# Patient Record
Sex: FEMALE | Race: WHITE | HISPANIC OR LATINO | Employment: FULL TIME | URBAN - METROPOLITAN AREA
[De-identification: names, ages, dates, MRNs, and addresses within clinical notes are randomized per-mention and may not be internally consistent; named-entity substitution may affect disease eponyms.]

---

## 2019-01-21 ENCOUNTER — NON-PROVIDER VISIT (OUTPATIENT)
Dept: URGENT CARE | Facility: PHYSICIAN GROUP | Age: 24
End: 2019-01-21

## 2019-01-21 DIAGNOSIS — Z02.1 PRE-EMPLOYMENT DRUG SCREENING: ICD-10-CM

## 2019-01-21 LAB
AMP AMPHETAMINE: NORMAL
COC COCAINE: NORMAL
INT CON NEG: NEGATIVE
INT CON POS: POSITIVE
MET METHAMPHETAMINES: NORMAL
OPI OPIATES: NORMAL
PCP PHENCYCLIDINE: NORMAL
POC DRUG COMMENT 753798-OCCUPATIONAL HEALTH: NEGATIVE
THC: NORMAL

## 2019-01-21 PROCEDURE — 80305 DRUG TEST PRSMV DIR OPT OBS: CPT | Performed by: PHYSICIAN ASSISTANT

## 2019-01-31 ENCOUNTER — EH NON-PROVIDER (OUTPATIENT)
Dept: OCCUPATIONAL MEDICINE | Facility: CLINIC | Age: 24
End: 2019-01-31

## 2019-01-31 ENCOUNTER — HOSPITAL ENCOUNTER (OUTPATIENT)
Facility: MEDICAL CENTER | Age: 24
End: 2019-01-31
Attending: PREVENTIVE MEDICINE
Payer: COMMERCIAL

## 2019-01-31 ENCOUNTER — EMPLOYEE HEALTH (OUTPATIENT)
Dept: OCCUPATIONAL MEDICINE | Facility: CLINIC | Age: 24
End: 2019-01-31

## 2019-01-31 VITALS
TEMPERATURE: 98 F | RESPIRATION RATE: 16 BRPM | HEART RATE: 86 BPM | HEIGHT: 61 IN | OXYGEN SATURATION: 99 % | BODY MASS INDEX: 30.21 KG/M2 | DIASTOLIC BLOOD PRESSURE: 76 MMHG | WEIGHT: 160 LBS | SYSTOLIC BLOOD PRESSURE: 114 MMHG

## 2019-01-31 DIAGNOSIS — Z02.1 ENCOUNTER FOR PRE-EMPLOYMENT HEALTH SCREENING EXAMINATION: ICD-10-CM

## 2019-01-31 DIAGNOSIS — Z02.1 PRE-EMPLOYMENT DRUG SCREENING: ICD-10-CM

## 2019-01-31 LAB
AMP AMPHETAMINE: NORMAL
BAR BARBITURATES: NORMAL
BZO BENZODIAZEPINES: NORMAL
COC COCAINE: NORMAL
INT CON NEG: NORMAL
INT CON POS: NORMAL
MDMA ECSTASY: NORMAL
MET METHAMPHETAMINES: NORMAL
MTD METHADONE: NORMAL
OPI OPIATES: NORMAL
OXY OXYCODONE: NORMAL
PCP PHENCYCLIDINE: NORMAL
POC URINE DRUG SCREEN OCDRS: NORMAL
RUBV AB SER QL: 178.3 IU/ML
THC: NORMAL

## 2019-01-31 PROCEDURE — 90715 TDAP VACCINE 7 YRS/> IM: CPT | Performed by: PREVENTIVE MEDICINE

## 2019-01-31 PROCEDURE — 8915 PR COMPREHENSIVE PHYSICAL: Performed by: PREVENTIVE MEDICINE

## 2019-01-31 PROCEDURE — 86765 RUBEOLA ANTIBODY: CPT | Performed by: PREVENTIVE MEDICINE

## 2019-01-31 PROCEDURE — 80305 DRUG TEST PRSMV DIR OPT OBS: CPT | Performed by: PREVENTIVE MEDICINE

## 2019-01-31 PROCEDURE — 90686 IIV4 VACC NO PRSV 0.5 ML IM: CPT | Performed by: PREVENTIVE MEDICINE

## 2019-01-31 PROCEDURE — 86762 RUBELLA ANTIBODY: CPT | Performed by: PREVENTIVE MEDICINE

## 2019-01-31 PROCEDURE — 86735 MUMPS ANTIBODY: CPT | Performed by: PREVENTIVE MEDICINE

## 2019-01-31 PROCEDURE — 86787 VARICELLA-ZOSTER ANTIBODY: CPT | Performed by: PREVENTIVE MEDICINE

## 2019-01-31 PROCEDURE — 86480 TB TEST CELL IMMUN MEASURE: CPT | Performed by: PREVENTIVE MEDICINE

## 2019-01-31 PROCEDURE — 90636 HEP A/HEP B VACC ADULT IM: CPT | Performed by: PREVENTIVE MEDICINE

## 2019-01-31 NOTE — PROGRESS NOTES
Pre-employment physical exam. See scanned documents    Patient's body mass index is 30.23 kg/m². Exercise and nutrition counseling were performed at this visit.

## 2019-02-01 LAB
MEV IGG SER IA-ACNC: 2.65
MUV IGG SER IA-ACNC: 1.33
VZV IGG SER IA-ACNC: 2.22

## 2019-02-02 LAB
GAMMA INTERFERON BACKGROUND BLD IA-ACNC: 0.05 IU/ML
M TB IFN-G BLD-IMP: NEGATIVE
M TB IFN-G CD4+ BCKGRND COR BLD-ACNC: -0.02 IU/ML
MITOGEN IGNF BCKGRD COR BLD-ACNC: >10 IU/ML
QFT TB2 - NIL TBQ2: -0.02 IU/ML

## 2019-02-04 ENCOUNTER — EH NON-PROVIDER (OUTPATIENT)
Dept: OCCUPATIONAL MEDICINE | Facility: CLINIC | Age: 24
End: 2019-02-04
Payer: COMMERCIAL

## 2019-02-04 DIAGNOSIS — Z71.85 IMMUNIZATION COUNSELING: ICD-10-CM

## 2019-03-06 ENCOUNTER — EH NON-PROVIDER (OUTPATIENT)
Dept: OCCUPATIONAL MEDICINE | Facility: CLINIC | Age: 24
End: 2019-03-06

## 2019-03-06 DIAGNOSIS — Z23 NEED FOR VACCINATION: ICD-10-CM

## 2019-03-06 PROCEDURE — 90636 HEP A/HEP B VACC ADULT IM: CPT | Performed by: PREVENTIVE MEDICINE

## 2019-04-22 ENCOUNTER — TELEPHONE (OUTPATIENT)
Dept: MEDICAL GROUP | Facility: LAB | Age: 24
End: 2019-04-22

## 2019-04-22 NOTE — TELEPHONE ENCOUNTER
NEW PATIENT VISIT PRE-VISIT PLANNING    1.  [employer] Patient is checked in Patient Demographics? YES    2.  Immunizations were updated in Epic using WebIZ?: Epic matches WebIZ       •  Web Iz Recommendations: HEPATITIS A , HEPATITIS B, HPV, MMR  and VARICELLA (Chicken Pox)     3.  Is this appointment scheduled as a Hospital Follow-Up? No    4.  Patient is due for the following Health Maintenance Topics:   Health Maintenance Due   Topic Date Due   • IMM VARICELLA (CHICKENPOX) VACCINE (1 of 2 - 13+ 2-dose series) 09/22/2009   • IMM HPV VACCINE (1 - Female 3-dose series) 09/22/2011   • CHLAMYDIA SCREENING  09/22/2012   • IMM MENINGOCOCCAL B VACCINE HEALTHY PATIENTS AGED 16 TO 23 (1 of 2 - MenB Trumenba 2-Dose Series) 09/22/2012   • PAP SMEAR  09/22/2017           5. Orders for overdue Health Maintenance topics pended in Pre-Charting? N\A    6.  Reviewed/Updated the following with patient:   •   Preferred Pharmacy? NO       •   Preferred Lab? NO       •   Preferred Communication? NO       •   Allergies? NO       •   Medications? NO       •   Social History? NO       •   Family History (document living status of immediate family members and if + hx of cancer, diabetes, hypertension, hyperlipidemia, heart attack, stroke) NO    7.  Updated Care Team?       •   DME Company (gait device, O2, CPAP, etc.) NO       •   Other Specialists (eye doctor, derm, GYN, cardiology, endo, etc): NO    8.  Patient was NOT informed to arrive 15 min prior to their   scheduled appointment and bring in their medication bottles.

## 2019-04-25 ENCOUNTER — HOSPITAL ENCOUNTER (OUTPATIENT)
Dept: LAB | Facility: MEDICAL CENTER | Age: 24
End: 2019-04-25
Attending: NURSE PRACTITIONER
Payer: COMMERCIAL

## 2019-04-25 ENCOUNTER — OFFICE VISIT (OUTPATIENT)
Dept: MEDICAL GROUP | Facility: LAB | Age: 24
End: 2019-04-25
Payer: COMMERCIAL

## 2019-04-25 VITALS
TEMPERATURE: 97.1 F | SYSTOLIC BLOOD PRESSURE: 118 MMHG | DIASTOLIC BLOOD PRESSURE: 76 MMHG | RESPIRATION RATE: 18 BRPM | HEART RATE: 70 BPM | HEIGHT: 61 IN | OXYGEN SATURATION: 97 % | WEIGHT: 155.4 LBS | BODY MASS INDEX: 29.34 KG/M2

## 2019-04-25 DIAGNOSIS — Z00.00 WELL ADULT EXAM: ICD-10-CM

## 2019-04-25 DIAGNOSIS — E01.0 THYROMEGALY: ICD-10-CM

## 2019-04-25 DIAGNOSIS — Z86.69 H/O BELL'S PALSY: ICD-10-CM

## 2019-04-25 LAB
ALBUMIN SERPL BCP-MCNC: 4.6 G/DL (ref 3.2–4.9)
ALBUMIN/GLOB SERPL: 1.4 G/DL
ALP SERPL-CCNC: 45 U/L (ref 30–99)
ALT SERPL-CCNC: 8 U/L (ref 2–50)
ANION GAP SERPL CALC-SCNC: 6 MMOL/L (ref 0–11.9)
AST SERPL-CCNC: 15 U/L (ref 12–45)
BASOPHILS # BLD AUTO: 0.9 % (ref 0–1.8)
BASOPHILS # BLD: 0.07 K/UL (ref 0–0.12)
BILIRUB SERPL-MCNC: 0.4 MG/DL (ref 0.1–1.5)
BUN SERPL-MCNC: 16 MG/DL (ref 8–22)
CALCIUM SERPL-MCNC: 9 MG/DL (ref 8.5–10.5)
CHLORIDE SERPL-SCNC: 104 MMOL/L (ref 96–112)
CHOLEST SERPL-MCNC: 158 MG/DL (ref 100–199)
CO2 SERPL-SCNC: 27 MMOL/L (ref 20–33)
CREAT SERPL-MCNC: 0.69 MG/DL (ref 0.5–1.4)
EOSINOPHIL # BLD AUTO: 0.1 K/UL (ref 0–0.51)
EOSINOPHIL NFR BLD: 1.3 % (ref 0–6.9)
ERYTHROCYTE [DISTWIDTH] IN BLOOD BY AUTOMATED COUNT: 41.9 FL (ref 35.9–50)
FASTING STATUS PATIENT QL REPORTED: NORMAL
GLOBULIN SER CALC-MCNC: 3.2 G/DL (ref 1.9–3.5)
GLUCOSE SERPL-MCNC: 85 MG/DL (ref 65–99)
HCT VFR BLD AUTO: 47.2 % (ref 37–47)
HDLC SERPL-MCNC: 49 MG/DL
HGB BLD-MCNC: 15 G/DL (ref 12–16)
IMM GRANULOCYTES # BLD AUTO: 0.02 K/UL (ref 0–0.11)
IMM GRANULOCYTES NFR BLD AUTO: 0.3 % (ref 0–0.9)
LDLC SERPL CALC-MCNC: 95 MG/DL
LYMPHOCYTES # BLD AUTO: 2.07 K/UL (ref 1–4.8)
LYMPHOCYTES NFR BLD: 26.6 % (ref 22–41)
MCH RBC QN AUTO: 29.7 PG (ref 27–33)
MCHC RBC AUTO-ENTMCNC: 31.8 G/DL (ref 33.6–35)
MCV RBC AUTO: 93.5 FL (ref 81.4–97.8)
MONOCYTES # BLD AUTO: 0.52 K/UL (ref 0–0.85)
MONOCYTES NFR BLD AUTO: 6.7 % (ref 0–13.4)
NEUTROPHILS # BLD AUTO: 5.01 K/UL (ref 2–7.15)
NEUTROPHILS NFR BLD: 64.2 % (ref 44–72)
NRBC # BLD AUTO: 0 K/UL
NRBC BLD-RTO: 0 /100 WBC
PLATELET # BLD AUTO: 307 K/UL (ref 164–446)
PMV BLD AUTO: 9.7 FL (ref 9–12.9)
POTASSIUM SERPL-SCNC: 3.6 MMOL/L (ref 3.6–5.5)
PROT SERPL-MCNC: 7.8 G/DL (ref 6–8.2)
RBC # BLD AUTO: 5.05 M/UL (ref 4.2–5.4)
SODIUM SERPL-SCNC: 137 MMOL/L (ref 135–145)
TRIGL SERPL-MCNC: 68 MG/DL (ref 0–149)
TSH SERPL DL<=0.005 MIU/L-ACNC: 2.71 UIU/ML (ref 0.38–5.33)
WBC # BLD AUTO: 7.8 K/UL (ref 4.8–10.8)

## 2019-04-25 PROCEDURE — 99385 PREV VISIT NEW AGE 18-39: CPT | Performed by: NURSE PRACTITIONER

## 2019-04-25 PROCEDURE — 85025 COMPLETE CBC W/AUTO DIFF WBC: CPT

## 2019-04-25 PROCEDURE — 80061 LIPID PANEL: CPT

## 2019-04-25 PROCEDURE — 80053 COMPREHEN METABOLIC PANEL: CPT

## 2019-04-25 PROCEDURE — 84443 ASSAY THYROID STIM HORMONE: CPT

## 2019-04-25 PROCEDURE — 36415 COLL VENOUS BLD VENIPUNCTURE: CPT

## 2019-04-25 ASSESSMENT — PATIENT HEALTH QUESTIONNAIRE - PHQ9: CLINICAL INTERPRETATION OF PHQ2 SCORE: 0

## 2019-04-25 NOTE — PROGRESS NOTES
cc: well exam    Subjective:     Nathaly Luo is a 23 y.o. female presents for a routine preventive health exam.    Ob-Gyn/ History:    /Para:  0/0  Last Pap Smear: Patient has never had a Pap.  We have schedule her for one today.  Gyn Surgery: Denies  Current Contraceptive Method: Condoms.  She is currently sexually active.  Same boyfriend and only sex partner.  Last menstrual period:   Periods regular  Urinary incontinence: Denies  Folate intake: Advised.    Health Maintenance  Diabetes Screening: Fasting blood glucose ordered today  Diet and Exercise: Body mass index is 29.36 kg/m².  Patient and I discussed the importance of lifestyle changes, with particular emphasis on decreasing sugar and carbohydrate intake and increasing plant-based nutrition (for the purposes of weight loss, general health, and prevention of chronic illnesses), as well as regular cardiovascular exercise, proper sleep, and stress management. Patient verbalized understanding.  Substance Abuse: Denies  Safe in relationship. Yes  Seat belts, bike helmet, gun safety discussed.  Sun protection used.    Cancer screening  Colorectal Cancer Screening: At age 51.  Cervical Cancer Screening: Scheduled a Pap smear today for 1 month.  Breast Cancer Screening: At age 41.    Infectious disease screening/Immunizations  --STI Screening: Will perform chlamydia screening with Pap.  --Practices safe sex.  --HIV Screening: Patient declined    --Immunizations:    Influenza: 2019   HPV: Patient reports that she had the HPV series in [place] however she does not have records and cannot remember where she had it done   Tetanus: 2019.    Review of systems:  Denies any weight loss, fevers, fatigue, vision changes, sore throat, swollen glands, rashes, shortness of breath, chest pain, numbness, nausea, vomiting, diarrhea, +constipation, abdominal pain, dysuria, hematuria, joint pain, muscle weakness, depression. All systems were reviewed and  "are negative.    No current outpatient prescriptions on file.    No Known Allergies    History reviewed. No pertinent past medical history.  History reviewed. No pertinent surgical history.  Family History   Problem Relation Age of Onset   • Diabetes Mother    • Autoimmune Disease Mother    • Cancer Maternal Aunt      Social History     Social History   • Marital status: Single     Spouse name: N/A   • Number of children: N/A   • Years of education: N/A     Occupational History   • Not on file.     Social History Main Topics   • Smoking status: Never Smoker   • Smokeless tobacco: Never Used   • Alcohol use No   • Drug use: No   • Sexual activity: Yes     Partners: Male     Birth control/ protection: Condom     Other Topics Concern   • Not on file     Social History Narrative   • No narrative on file       Objective:     Vitals: /76 (BP Location: Right arm, Patient Position: Sitting, BP Cuff Size: Adult)   Pulse 70   Temp 36.2 °C (97.1 °F) (Temporal)   Resp 18   Ht 1.549 m (5' 1\")   Wt 70.5 kg (155 lb 6.4 oz)   LMP 04/14/2019   SpO2 97%   BMI 29.36 kg/m²   General: Alert, pleasant, NAD  HEENT:  Normocephalic.  PERRL, EOMI, no icterus or pallor.  Conjunctivae and lids normal.  External ears normal. Tympanic membranes pearly, opaque.  Nares patent, mucosa pink.  Oropharynx non-erythematous, mucous membranes moist.  Neck supple.  No thyromegaly or masses palpated. No cervical or supraclavicular lymphadenopathy.  Heart:  Regular rate and rhythm.  S1 and S2 normal.  No murmurs appreciated.  Respiratory:  Normal respiratory effort.  Clear to auscultation bilaterally.  Abdomen:  Bowel sounds present.  Non-distended, soft, non-tender, no guarding/rebound. No hepatosplenomegaly.  No hernias.  Skin:  Warm, dry, no rashes  Musculoskeletal:  Gait is normal.  Moves all extremities well.  Extremities:  Pedal pulses 2+ symmetric. No leg edema.  Neurological: No tremors, sensation grossly intact, patellar and biceps " reflexes 2+ symmetric, tone/strength normal  Psych:  Affect/mood is normal, judgement is good, memory is intact, grooming is appropriate.        Assessment/Plan:     Nathaly was seen today for annual exam.    Diagnoses and all orders for this visit:    Well adult exam  -     CBC WITH DIFFERENTIAL; Future  -     Comp Metabolic Panel; Future  -     TSH WITH REFLEX TO FT4; Future  -     Lipid Profile; Future      Thyromegaly  -     TSH WITH REFLEX TO FT4; Future        Patient Counseling:  --Discussed moderation in sodium/caffeine intake, saturated fat and cholesterol, caloric balance, sufficient fresh fruits/vegetables, fiber, iron, and 0.4-0.8mg of folate supplement per day (for females capable of pregnancy).  --Discussed brushing, flossing, and dental visits.   --Encouraged regular exercise.   --Discussed tobacco, alcohol, or other drug use; availability of treatment for abuse.   --Discussed sexually transmitted infections, partner selection, use of condoms, avoidance of unintended pregnancy and contraceptive alternatives.  --Injury prevention: Discussed safety belts, safety helmets, smoke detector, etc.    Preventive visit in 1 year, sooner as needed for any concerns.     Return in about 4 weeks (around 5/23/2019) for Pap.     Please note that this dictation was created using voice recognition software. I have made every reasonable attempt to correct obvious errors, but I expect that there are errors of grammar and possibly content that I did not discover before finalizing the note.

## 2019-05-01 ENCOUNTER — OFFICE VISIT (OUTPATIENT)
Dept: MEDICAL GROUP | Facility: LAB | Age: 24
End: 2019-05-01
Payer: COMMERCIAL

## 2019-05-01 ENCOUNTER — HOSPITAL ENCOUNTER (OUTPATIENT)
Facility: MEDICAL CENTER | Age: 24
End: 2019-05-01
Attending: NURSE PRACTITIONER
Payer: COMMERCIAL

## 2019-05-01 VITALS
BODY MASS INDEX: 30.63 KG/M2 | HEART RATE: 102 BPM | TEMPERATURE: 99.9 F | DIASTOLIC BLOOD PRESSURE: 68 MMHG | RESPIRATION RATE: 12 BRPM | WEIGHT: 156 LBS | HEIGHT: 60 IN | SYSTOLIC BLOOD PRESSURE: 98 MMHG | OXYGEN SATURATION: 96 %

## 2019-05-01 DIAGNOSIS — Z01.419 WELL WOMAN EXAM WITH ROUTINE GYNECOLOGICAL EXAM: ICD-10-CM

## 2019-05-01 DIAGNOSIS — Z12.4 SCREENING FOR CERVICAL CANCER: ICD-10-CM

## 2019-05-01 DIAGNOSIS — Z30.011 ENCOUNTER FOR INITIAL PRESCRIPTION OF CONTRACEPTIVE PILLS: ICD-10-CM

## 2019-05-01 DIAGNOSIS — E66.9 OBESITY (BMI 30-39.9): ICD-10-CM

## 2019-05-01 PROCEDURE — 99395 PREV VISIT EST AGE 18-39: CPT | Performed by: NURSE PRACTITIONER

## 2019-05-01 PROCEDURE — 87491 CHLMYD TRACH DNA AMP PROBE: CPT

## 2019-05-01 PROCEDURE — 88175 CYTOPATH C/V AUTO FLUID REDO: CPT

## 2019-05-01 PROCEDURE — 87591 N.GONORRHOEAE DNA AMP PROB: CPT

## 2019-05-01 RX ORDER — NORGESTIMATE AND ETHINYL ESTRADIOL 7DAYSX3 LO
1 KIT ORAL DAILY
Qty: 28 TAB | Refills: 0 | Status: SHIPPED | OUTPATIENT
Start: 2019-05-01 | End: 2019-06-28

## 2019-05-01 NOTE — PROGRESS NOTES
SUBJECTIVE: 23 y.o. No obstetric history on file. female for annual routine gynecologic exam and discussion of birth control options.    Obstetric History     No data available      Last Pap:This is first pap today  History   Sexual Activity   • Sexual activity: Yes   • Partners: Male   • Birth control/ protection: Condom     Sexual history: currently sexually active, single partner   H/O Abnormal Pap no  She  reports that she has never smoked. She has never used smokeless tobacco.        Allergies: Daigle and Other environmental     ROS:       PREMENOPAUSAL  Menses every month with 6 days heavy bleeding   Cramping is moderate.       No significant bloating/fluid retention, pelvic pain, or dyspareunia. No vaginal discharge   No breast tenderness, mass, nipple discharge, changes in size or contour, or abnormal cyclic discomfort.  No urinary tract symptoms, no incontinence, no polydipsia, polyuria,  No abdominal pain, change in bowel habits, black or bloody stools.    No unusual headaches, no visual changes, menstrual migraines   No prolonged cough. No dyspnea or chest pain on exertion.  No depression, labile mood, anxiety, libido changes, insomnia.  No temperature intolerance.  No new/concerning skin lesions, concerns.     Exercise: moderate regular exercise program    Preventive Care:  HPV vaccine Reports that she had but in [place]. Records requested  Tetanus booster: Patient reports she is up-to-date will request records.  Pneumonia vaccine: Declines  Flu vaccine: Done yearly         Current medicines (including changes today)  No current outpatient prescriptions on file.     No current facility-administered medications for this visit.      She  has no past medical history on file.  She  has no past surgical history on file.     Family History:   Family History   Problem Relation Age of Onset   • Diabetes Mother    • Autoimmune Disease Mother    • Cancer Maternal Aunt        OBJECTIVE:   LMP 04/14/2019    There is no height or weight on file to calculate BMI.    HEENT: NC/AT, MMM, oropharynx clear  NECK:  No cervical or supraclavicular KIMBERLYN  NEURO: Cranial nerves II-XII grossly intact  CARDIOVASCULAR:  Regular rate and rhythm.  S1 and S2 normal.  No edema  LUNGS: CTAB  ABDOMEN:  Soft without tenderness, guarding, mass or organomegaly.  No CVA tenderness or inguinal adenopathy.   EXTREMITIES:  peripheral pulses are 2+.   SKIN: color normal, vascularity normal, temperature normal. No rashes or suspicious skin lesions noted.  BREAST: Performed with instruction during examination. No axillary lymphadenopathy, no skin changes, no dominant masses. No nipple retraction  Pelvic Exam -  Normal external genitalia with no lesions. Normal vaginal mucosa with normal rugation and scant discharge. Cervix with no visible lesions. No cervical motion tenderness. Uterus is normal sized with no masses. No adnexal tenderness or enlargement appreciated. Thin Prep Pap is obtained, vaginal swab is not obtained and specimen(s) sent to lab    <ASSESSMENT and PLAN>  1. Well woman exam with routine gynecological exam  - THINPREP RFLX HPV ASCUS W/CTNG; Future    2. Screening for cervical cancer  - THINPREP RFLX HPV ASCUS W/CTNG; Future    3. Encounter for initial prescription of contraceptive pills  We discussed that birth control can increase risk of blood clots, liver tumors, gallbladder disease, and stroke. Side effects can include headache, constipation, nausea, mood swings. They do not protect against STDs, therefore condoms should be worn with new partners. They are not effective if not taken as prescribed, if a dose is missed or delayed a backup method should be used. Antibiotics can make birth control less effective, if an antibiotic is taken a backup method should be used. Any unusual headache, leg pain/swelling, chest pain, shortness of breath, difficulty speaking or moving should be addressed immediately in the ER. Patient verbalizes  understanding.       - Norgestim-Eth Estrad Triphasic 0.18/0.215/0.25 MG-25 MCG Tab; Take 1 tablet by mouth every day.  Dispense: 28 Tab; Refill: 0      4. Obesity (BMI 30-39.9)    - Patient identified as having weight management issue.  Appropriate orders and counseling given.    Discussed  breast self exam, STD prevention, use and side effects of OCP's, diet and exercise   Follow-up in 3 years for next Pap if results are normal.   Next office visit for recheck of chronic medical conditions is due in 3 months    Return in about 1 year (around 5/1/2020) for Preventative/Annual physical.    Please note that this dictation was created using voice recognition software. I have made every reasonable attempt to correct obvious errors, but I expect that there are errors of grammar and possibly content that I did not discover before finalizing the note.

## 2019-05-01 NOTE — PATIENT INSTRUCTIONS
birth control can increase risk of blood clots, liver tumors, gallbladder disease, and stroke. Side effects can include headache, constipation, nausea, mood swings. They do not protect against STDs, therefore condoms should be worn with new partners. They are not effective if not taken as prescribed, if a dose is missed or delayed a backup method should be used. Antibiotics can make birth control less effective, if an antibiotic is taken a backup method should be used. Any unusual headache, leg pain/swelling, chest pain, shortness of breath, difficulty speaking or moving should be addressed immediately in the ER.

## 2019-05-02 DIAGNOSIS — Z12.4 SCREENING FOR CERVICAL CANCER: ICD-10-CM

## 2019-05-02 DIAGNOSIS — Z01.419 WELL WOMAN EXAM WITH ROUTINE GYNECOLOGICAL EXAM: ICD-10-CM

## 2019-05-02 LAB — AMBIGUOUS DTTM AMBI4: NORMAL

## 2019-05-06 LAB
C TRACH DNA GENITAL QL NAA+PROBE: NEGATIVE
CYTOLOGY REG CYTOL: NORMAL
N GONORRHOEA DNA GENITAL QL NAA+PROBE: NEGATIVE
SPECIMEN SOURCE: NORMAL

## 2019-06-28 ENCOUNTER — OFFICE VISIT (OUTPATIENT)
Dept: MEDICAL GROUP | Facility: LAB | Age: 24
End: 2019-06-28
Payer: COMMERCIAL

## 2019-06-28 VITALS
RESPIRATION RATE: 16 BRPM | OXYGEN SATURATION: 96 % | HEIGHT: 61 IN | DIASTOLIC BLOOD PRESSURE: 72 MMHG | HEART RATE: 72 BPM | TEMPERATURE: 97.7 F | BODY MASS INDEX: 30.43 KG/M2 | SYSTOLIC BLOOD PRESSURE: 112 MMHG | WEIGHT: 161.2 LBS

## 2019-06-28 DIAGNOSIS — Z30.013 ENCOUNTER FOR INITIAL PRESCRIPTION OF INJECTABLE CONTRACEPTIVE: ICD-10-CM

## 2019-06-28 LAB
INT CON NEG: NEGATIVE
INT CON POS: POSITIVE
POC URINE PREGNANCY TEST: NORMAL

## 2019-06-28 PROCEDURE — 81025 URINE PREGNANCY TEST: CPT | Performed by: NURSE PRACTITIONER

## 2019-06-28 PROCEDURE — 99213 OFFICE O/P EST LOW 20 MIN: CPT | Performed by: NURSE PRACTITIONER

## 2019-06-28 RX ORDER — MEDROXYPROGESTERONE ACETATE 150 MG/ML
150 INJECTION, SUSPENSION INTRAMUSCULAR ONCE
Status: COMPLETED | OUTPATIENT
Start: 2019-06-28 | End: 2019-06-28

## 2019-06-28 RX ADMIN — MEDROXYPROGESTERONE ACETATE 150 MG: 150 INJECTION, SUSPENSION INTRAMUSCULAR at 08:13

## 2019-06-28 NOTE — ASSESSMENT & PLAN NOTE
Patient was started on Sprintec several months ago and she electively discontinued it due to side effects mainly nausea.  She does not wish to take oral birth control pills anymore.  She is interested in different birth control methods.  Those were discussed today.  She is most interested in either Depakote or Nexplanon.  Discussed with patient today were forms of birth control. We reviewed birth control pills and their use, risks benefits and side effects. I reviewed Depo-Provera use as well as risk-benefit side effect, I also discussed with the patient NuvaRing risks benefits and side effects. We also discussed IUDs, discussed progesterone containing IUDs such as Mirena and Lin. I also discussed ParaGard IUD. I discussed risks benefits and side effects of all these medications. We also discussed Nexplanon, subdermal implant, risks benefits and side effects.  Also discussed with patient were barrier methods especially condoms for prevention of STDs.  Patient today is wishing to do the double shot.  We have obtained a negative urine pregnancy test and patient will follow-up in 3 months for next shot and to see how she is doing with this method.

## 2019-06-28 NOTE — PROGRESS NOTES
CC:The encounter diagnosis was Encounter for initial prescription of injectable contraceptive.    HISTORY OF PRESENT ILLNESS: Nathaly Luo is a 23 y.o. female established patient who presents today to discuss the following problems:    Encounter for initial prescription of injectable contraceptive  Patient was started on Sprintec several months ago and she electively discontinued it due to side effects mainly nausea.  She does not wish to take oral birth control pills anymore.  She is interested in different birth control methods.  Those were discussed today.  She is most interested in either Depakote or Nexplanon.  Discussed with patient today were forms of birth control. We reviewed birth control pills and their use, risks benefits and side effects. I reviewed Depo-Provera use as well as risk-benefit side effect, I also discussed with the patient NuvaRing risks benefits and side effects. We also discussed IUDs, discussed progesterone containing IUDs such as Mirena and Lin. I also discussed ParaGard IUD. I discussed risks benefits and side effects of all these medications. We also discussed Nexplanon, subdermal implant, risks benefits and side effects.  Also discussed with patient were barrier methods especially condoms for prevention of STDs.  Patient today is wishing to do the double shot.  We have obtained a negative urine pregnancy test and patient will follow-up in 3 months for next shot and to see how she is doing with this method.        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     Current Facility-Administered Medications   Medication Dose Route Frequency Provider Last Rate Last Dose   •  "medroxyPROGESTERone (DEPO-PROVERA) injection 150 mg  150 mg Intramuscular Once AKIKO Guillermo           Social History   Substance Use Topics   • Smoking status: Never Smoker   • Smokeless tobacco: Never Used   • Alcohol use No     Social History     Social History Narrative   • No narrative on file       Family History   Problem Relation Age of Onset   • Diabetes Mother    • Autoimmune Disease Mother    • Cancer Maternal Aunt        ROS:     Constitutional:  Negative for fever, chills, unexpected weight change, and fatigue/generalized weakness.  HEENT:  Negative for headaches.    Respiratory: Negative for cough, sputum production, chest congestion, dyspnea, wheezing, and crackles.    Cardiovascular:Negative for chest pain, palpitations, orthopnea, and bilateral lower extremity edema.   Gastrointestinal:Negative for heartburn, nausea, vomiting, abdominal pain, hematochezia, melena, diarrhea, constipation, and greasy/foul-smelling stools.    Musculoskeletal:Negative for myalgias, back pain, and joint pain.   Skin: Negative for rash, itching, cyanotic skin color change.   Neurological: Negative for dizziness, tingling, tremors, focal sensory deficit, focal weakness and headaches.   Psychiatric/Behavioral: Negative for depression, suicidal/homicidal ideation and memory loss.        EXAM:   /72 (BP Location: Left arm, Patient Position: Sitting, BP Cuff Size: Adult)   Pulse 72   Temp 36.5 °C (97.7 °F) (Temporal)   Resp 16   Ht 1.549 m (5' 1\")   Wt 73.1 kg (161 lb 3.2 oz)   SpO2 96%  Body mass index is 30.46 kg/m².    Constitutional: Well-developed and well-nourished. Not diaphoretic. No distress.   Skin: Skin is warm and dry. No rash noted.  Head: Atraumatic without lesions.  Neck: Supple, trachea midline. No thyromegaly present. No cervical or supraclavicular lymphadenopathy.  Cardiovascular: Regular rate and rhythm.   Chest: Effort normal. Clear to auscultation throughout. No adventitious " sounds.   Abdomen: Soft, non tender, and without distention. Active bowel sounds in all four quadrants. No rebound, guarding, masses or hepatosplenomegaly.  Extremities: No cyanosis, clubbing, erythema, nor edema.   Neurological: Alert and oriented x 3. Sensation intact.   Psychiatric:  Behavior, mood, and affect are appropriate.       ASSESSMENT/PLAN:    1. Encounter for initial prescription of injectable contraceptive  Today I had a discussion with the patient regarding birth control methods. The patient desires Depo-Provera at this time. I discussed with the patient Depo-Provera is a progesterone only form of birth control that requires an injection every 3 months. Side effects of the Depo-Provera can be amenorrhea, irregular spotting or worsening of menstrual cycle. I also discussed the potential for weight gain associated with Depo-Provera, 10% of women will be 10 pounds or more will using Depo-Provera Depo-Provera also may worsen migraines if the patient is prone to migraines.  After discussion of Depo-Provera patient elected to proceed with injection    - POCT Urinalysis  - medroxyPROGESTERone (DEPO-PROVERA) injection 150 mg; 1 mL by Intramuscular route Once.      Return in about 3 months (around 9/28/2019) for Depo shot , Routine Follow up.       Please note that this dictation was created using voice recognition software. I have made every reasonable attempt to correct obvious errors, but I expect that there are errors of grammar and possibly content that I did not discover before finalizing the note.

## 2019-09-20 ENCOUNTER — OFFICE VISIT (OUTPATIENT)
Dept: MEDICAL GROUP | Facility: LAB | Age: 24
End: 2019-09-20
Payer: COMMERCIAL

## 2019-09-20 VITALS
TEMPERATURE: 99 F | DIASTOLIC BLOOD PRESSURE: 72 MMHG | HEIGHT: 60 IN | WEIGHT: 170 LBS | OXYGEN SATURATION: 98 % | HEART RATE: 89 BPM | RESPIRATION RATE: 16 BRPM | BODY MASS INDEX: 33.38 KG/M2 | SYSTOLIC BLOOD PRESSURE: 102 MMHG

## 2019-09-20 DIAGNOSIS — Z30.42 ENCOUNTER FOR SURVEILLANCE OF INJECTABLE CONTRACEPTIVE: ICD-10-CM

## 2019-09-20 DIAGNOSIS — K64.8 OTHER HEMORRHOIDS: ICD-10-CM

## 2019-09-20 DIAGNOSIS — K59.09 OTHER CONSTIPATION: ICD-10-CM

## 2019-09-20 PROCEDURE — 99214 OFFICE O/P EST MOD 30 MIN: CPT | Mod: 25 | Performed by: NURSE PRACTITIONER

## 2019-09-20 PROCEDURE — 96372 THER/PROPH/DIAG INJ SC/IM: CPT | Performed by: NURSE PRACTITIONER

## 2019-09-20 RX ORDER — MEDROXYPROGESTERONE ACETATE 150 MG/ML
150 INJECTION, SUSPENSION INTRAMUSCULAR ONCE
Status: COMPLETED | OUTPATIENT
Start: 2019-09-20 | End: 2019-09-20

## 2019-09-20 RX ORDER — POLYETHYLENE GLYCOL 3350 17 G/17G
17 POWDER, FOR SOLUTION ORAL DAILY
Qty: 1 BOTTLE | Refills: 3 | Status: SHIPPED | OUTPATIENT
Start: 2019-09-20 | End: 2019-12-17

## 2019-09-20 RX ADMIN — MEDROXYPROGESTERONE ACETATE 150 MG: 150 INJECTION, SUSPENSION INTRAMUSCULAR at 08:03

## 2019-09-20 NOTE — PROGRESS NOTES
CC:Diagnoses of Other hemorrhoids, Encounter for surveillance of injectable contraceptive, and Other constipation were pertinent to this visit.    HISTORY OF PRESENT ILLNESS: Nathaly Luo is a 23 y.o. female established patient who presents today to discuss the following problems:    Patient is here today to follow-up on her initial injection of Depo-Provera for birth control.  She is doing well and reports that she has only had one.  With light bleeding since her last visit.  She has gained some weight since her prior visit and will we have discussed keeping a close eye on her weight.      External Hemorrhoid    There is a new problem to discuss.  Patient reports that approximately 5 days ago she began to experience rectal pain related to an external hemorrhoid.  She does have history of the same in the past.  She has associated constipation.  Does report that she strains to go to the bathroom.  Her last bowel movement was 4 days ago.  Pain is aggravated by bowel movements, sitting and walking.  She has tried over-the-counter Preparation H and this has not been helping.  She has had some bright red blood on toilet tissue.  She has not tried anything else for this other than the Preparation H.  She denies any fevers, weight loss, chills, melena.      Constipation  This is a new problem that has been chronic for patient.  The problem is unchanged. Stool frequency: no BM x 4- 5 days frequently. The patient is not on a high fiber diet. Pt does not exercise regularly. There has not been adequate water intake. Associated symptoms include abdominal pain. Pertinent negatives include no diarrhea, fever, nausea, vomiting or weight loss. Treatments tried: colace. The treatment provided no relief.     There is no history of inflammatory bowel disease or irritable bowel syndrome.                   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 1 Tube 0     No current facility-administered medications for this visit.        Social History     Tobacco Use   • Smoking status: Never Smoker   • Smokeless tobacco: Never Used   Substance Use Topics   • Alcohol use: No   • Drug use: No     Social History     Social History Narrative   • Not on file       Family History   Problem Relation Age of Onset   • Diabetes Mother    • Autoimmune Disease Mother    • Cancer Maternal Aunt        ROS: Positives per HPI    Constitutional: Negative for fever and weight loss.   Eyes: Negative for blurred vision.   Respiratory: Negative for shortness of breath.    Cardiovascular: Negative for chest pain and palpitations.   Gastrointestinal: Negative for vomiting, diarrhea and melena.  Genitourinary: Negative for dysuria and urgency.   Skin: Negative for itching and rash.   Neurological: Negative for dizziness, tingling and headaches.   Psychiatric/Behavioral: Negative for depression.   All other systems reviewed and are negative.         EXAM:   /72 (BP Location: Right arm, Patient Position: Sitting, BP Cuff Size: Adult)   Pulse 89   Temp 37.2 °C (99 °F) (Temporal)   Resp 16   Ht 1.524 m (5')   Wt 77.1 kg (170 lb)   SpO2 98%  Body mass index is 33.2 kg/m².    Physical Exam   Constitutional: Pt is oriented to person, place, and time. Pt appears well-developed and well-nourished.   HENT:   Head: Normocephalic and atraumatic.   Eyes: Conjunctivae and EOM are normal. Pupils are equal, round, and reactive to light. No scleral icterus.   Neck: Normal range of motion. Neck  supple. No JVD present. No thyromegaly present.   Cardiovascular: Normal rate, regular rhythm, normal heart sounds and intact distal pulses.  Exam reveals no gallop and no friction rub.    No murmur heard.  Pulmonary/Chest: Effort normal and breath sounds normal. No respiratory distress. Pt has no wheezes. Pt has no rales. Pt exhibits no tenderness.   Abdominal: Soft. Bowel sounds are normal. Pt exhibits no distension and no mass. There is no tenderness. There is no rebound and no guarding.   Rectal: Large external, non ulcerated hemorrhoid. Extremely tender to palpation.   Musculoskeletal: Normal range of motion. Pt exhibits no edema.   Lymphadenopathy:     Pt has no cervical adenopathy.   Neurological: Pt is alert and oriented to person, place, and time. No cranial nerve deficit.   Skin: Skin is warm and dry. No erythema.   Psychiatric:  behavior is normal.         ASSESSMENT/PLAN:    1. Other hemorrhoids  New problem uncontrolled. Sitz baths TID for comfort. Increased fluid and discussed constipation treatments.  Conservative measures discussed. Will consider referral to GI for banding if symptoms do not improve.    - hydrocortisone 2.5 % Cream topical cream; Apply 1 Application to affected area(s) 2 times a day.  Dispense: 1 Tube; Refill: 0    2. Encounter for surveillance of injectable contraceptive  Doing well on depo. No side effects. F/U for MA visit for next injection.   Today I had a discussion with the patient regarding birth control methods. The patient desires Depo-Provera at this time. I discussed with the patient Depo-Provera is a progesterone only form of birth control that requires an injection every 3 months. Side effects of the Depo-Provera can be amenorrhea, irregular spotting or worsening of menstrual cycle. I also discussed the potential for weight gain associated with Depo-Provera, 10% of women will be 10 pounds or more will using Depo-Provera Depo-Provera also may worsen migraines if the  patient is prone to migraines.  After discussion of Depo-Provera patient elected to proceed with injection    - medroxyPROGESTERone (DEPO-PROVERA) injection 150 mg    3. Other constipation  New to me chronic problem. Have provided patient with instructions for stepping up bowel protocol.  Increased water intake advised. Continue with colace OTC.   - polyethylene glycol 3350 (MIRALAX) Powder; Take 17 g by mouth every day.  Dispense: 1 Bottle; Refill: 3          Return in about 3 months (around 12/20/2019), or if symptoms worsen or fail to improve, for Routine Follow up.      Please note that this dictation was created using voice recognition software. I have made every reasonable attempt to correct obvious errors, but I expect that there are errors of grammar and possibly content that I did not discover before finalizing the note.

## 2019-09-20 NOTE — PATIENT INSTRUCTIONS
Hemorrhoids  Hemorrhoids are swollen veins in and around the rectum or anus. There are two types of hemorrhoids:  · Internal hemorrhoids. These occur in the veins that are just inside the rectum. They may poke through to the outside and become irritated and painful.  · External hemorrhoids. These occur in the veins that are outside of the anus and can be felt as a painful swelling or hard lump near the anus.  Most hemorrhoids do not cause serious problems, and they can be managed with home treatments such as diet and lifestyle changes. If home treatments do not help your symptoms, procedures can be done to shrink or remove the hemorrhoids.  What are the causes?  This condition is caused by increased pressure in the anal area. This pressure may result from various things, including:  · Constipation.  · Straining to have a bowel movement.  · Diarrhea.  · Pregnancy.  · Obesity.  · Sitting for long periods of time.  · Heavy lifting or other activity that causes you to strain.  · Anal sex.  What are the signs or symptoms?  Symptoms of this condition include:  · Pain.  · Anal itching or irritation.  · Rectal bleeding.  · Leakage of stool (feces).  · Anal swelling.  · One or more lumps around the anus.  How is this diagnosed?  This condition can often be diagnosed through a visual exam. Other exams or tests may also be done, such as:  · Examination of the rectal area with a gloved hand (digital rectal exam).  · Examination of the anal canal using a small tube (anoscope).  · A blood test, if you have lost a significant amount of blood.  · A test to look inside the colon (sigmoidoscopy or colonoscopy).  How is this treated?  This condition can usually be treated at home. However, various procedures may be done if dietary changes, lifestyle changes, and other home treatments do not help your symptoms. These procedures can help make the hemorrhoids smaller or remove them completely. Some of these procedures involve surgery,  and others do not. Common procedures include:  · Rubber band ligation. Rubber bands are placed at the base of the hemorrhoids to cut off the blood supply to them.  · Sclerotherapy. Medicine is injected into the hemorrhoids to shrink them.  · Infrared coagulation. A type of light energy is used to get rid of the hemorrhoids.  · Hemorrhoidectomy surgery. The hemorrhoids are surgically removed, and the veins that supply them are tied off.  · Stapled hemorrhoidopexy surgery. A circular stapling device is used to remove the hemorrhoids and use staples to cut off the blood supply to them.  Follow these instructions at home:  Eating and drinking  · Eat foods that have a lot of fiber in them, such as whole grains, beans, nuts, fruits, and vegetables. Ask your health care provider about taking products that have added fiber (fiber supplements).  · Drink enough fluid to keep your urine clear or pale yellow.  Managing pain and swelling  · Take warm sitz baths for 20 minutes, 3-4 times a day to ease pain and discomfort.  · If directed, apply ice to the affected area. Using ice packs between sitz baths may be helpful.  ¨ Put ice in a plastic bag.  ¨ Place a towel between your skin and the bag.  ¨ Leave the ice on for 20 minutes, 2-3 times a day.  General instructions  · Take over-the-counter and prescription medicines only as told by your health care provider.  · Use medicated creams or suppositories as told.  · Exercise regularly.  · Go to the bathroom when you have the urge to have a bowel movement. Do not wait.  · Avoid straining to have bowel movements.  · Keep the anal area dry and clean. Use wet toilet paper or moist towelettes after a bowel movement.  · Do not sit on the toilet for long periods of time. This increases blood pooling and pain.  Contact a health care provider if:  · You have increasing pain and swelling that are not controlled by treatment or medicine.  · You have uncontrolled bleeding.  · You have  difficulty having a bowel movement, or you are unable to have a bowel movement.  · You have pain or inflammation outside the area of the hemorrhoids.  This information is not intended to replace advice given to you by your health care provider. Make sure you discuss any questions you have with your health care provider.  Document Released: 12/15/2001 Document Revised: 05/17/2017 Document Reviewed: 08/31/2016  [employer] Interactive Patient Education © 2017 [employer]      Constipation is a fairly common problem, and fortunately there are many good treatments available.  Listed below are things you can do to help with this issue.  I recommend you start with the first suggestion listed, and if that is not enough to help then keep working your way down the list until you get to the point where you are having normal bowel movements, then try backing off down the list to see if you can maintain normal bowel movements with something less aggressive.    1)  Increasing water intake to about 80 ounces a day (a bit more than a half gallon of water) can help.  In addition, regular exercise can make an enormous difference besides being generally good for you anyway.  Making sure your diet includes plenty of fruits and vegetables is also very helpful.    2)  Adding additional fiber to your diet with products like metamucil, benefiber, citrucel, or psyllium can help by adding bulk to your stool, keeping it from getting quite so packed down.    3) Polyethylene glycol (Miralax or its generic equivalent) is an osmotic laxative, meaning it brings extra water into your colon, helping keep your stool from getting hard and packed down.  One capful a day should give normal soft stool within about 2-3 days.  If not, consider trying 1 capful twice a day.    4) Milk of magnesia (30 ml daily) or magnesium citrate (1/2 to 1 bottle daily), or a bisacodyl (Dulcolax) suppository can be used next to get things moving.    5) If this has been  ineffective, using Senna-S, 1-2 tablets one to two times daily can be helpful.    6) If all these measures have been ineffective, you can try a mineral oil enema or a warm tap water enema to see if this helps things.

## 2019-09-20 NOTE — LETTER
September 20, 2019       Patient: Nathaly Luo   YOB: 1995   Date of Visit: 9/20/2019         To Whom It May Concern:    It is my medical opinion that Nathaly Luo remain out of work until 9/21/2019.    If you have any questions or concerns, please don't hesitate to call [phone]          Sincerely,          KATI GuillermoRSamuelN.  Electronically Signed

## 2019-09-26 ENCOUNTER — IMMUNIZATION (OUTPATIENT)
Dept: OCCUPATIONAL MEDICINE | Facility: CLINIC | Age: 24
End: 2019-09-26

## 2019-09-26 DIAGNOSIS — Z23 NEED FOR VACCINATION: ICD-10-CM

## 2019-09-26 PROCEDURE — 90686 IIV4 VACC NO PRSV 0.5 ML IM: CPT | Performed by: NURSE PRACTITIONER

## 2019-09-27 ENCOUNTER — HOSPITAL ENCOUNTER (OUTPATIENT)
Dept: LAB | Facility: MEDICAL CENTER | Age: 24
End: 2019-09-27
Payer: COMMERCIAL

## 2019-09-27 LAB
BDY FAT % MEASURED: 37.2 %
BP DIAS: 74 MMHG
BP SYS: 123 MMHG
CHOLEST SERPL-MCNC: 180 MG/DL (ref 100–199)
DIABETES HTDIA: NO
EVENT NAME HTEVT: NORMAL
FASTING HTFAS: YES
GLUCOSE SERPL-MCNC: 84 MG/DL (ref 65–99)
HDLC SERPL-MCNC: 49 MG/DL
HYPERTENSION HTHYP: NO
LDLC SERPL CALC-MCNC: 111 MG/DL
SCREENING LOC CITY HTCIT: NORMAL
SCREENING LOC STATE HTSTA: NORMAL
SCREENING LOCATION HTLOC: NORMAL
SMOKING HTSMO: NO
SUBSCRIBER ID HTSID: NORMAL
TRIGL SERPL-MCNC: 100 MG/DL (ref 0–149)

## 2019-09-27 PROCEDURE — 80061 LIPID PANEL: CPT

## 2019-09-27 PROCEDURE — S5190 WELLNESS ASSESSMENT BY NONPH: HCPCS

## 2019-09-27 PROCEDURE — 36415 COLL VENOUS BLD VENIPUNCTURE: CPT

## 2019-09-27 PROCEDURE — 82947 ASSAY GLUCOSE BLOOD QUANT: CPT

## 2019-10-04 ENCOUNTER — EH NON-PROVIDER (OUTPATIENT)
Dept: OCCUPATIONAL MEDICINE | Facility: CLINIC | Age: 24
End: 2019-10-04

## 2019-10-04 DIAGNOSIS — Z23 NEED FOR VACCINATION: ICD-10-CM

## 2019-10-04 PROCEDURE — 90636 HEP A/HEP B VACC ADULT IM: CPT | Performed by: NURSE PRACTITIONER

## 2019-10-28 ENCOUNTER — OFFICE VISIT (OUTPATIENT)
Dept: MEDICAL GROUP | Facility: LAB | Age: 24
End: 2019-10-28
Payer: COMMERCIAL

## 2019-10-28 VITALS
OXYGEN SATURATION: 97 % | DIASTOLIC BLOOD PRESSURE: 86 MMHG | TEMPERATURE: 98.5 F | SYSTOLIC BLOOD PRESSURE: 110 MMHG | BODY MASS INDEX: 35.34 KG/M2 | WEIGHT: 180 LBS | HEART RATE: 87 BPM | HEIGHT: 60 IN

## 2019-10-28 DIAGNOSIS — Z30.09 ENCOUNTER FOR OTHER GENERAL COUNSELING AND ADVICE ON CONTRACEPTION: ICD-10-CM

## 2019-10-28 DIAGNOSIS — G43.109 MIGRAINE WITH AURA AND WITHOUT STATUS MIGRAINOSUS, NOT INTRACTABLE: ICD-10-CM

## 2019-10-28 PROCEDURE — 99214 OFFICE O/P EST MOD 30 MIN: CPT | Performed by: NURSE PRACTITIONER

## 2019-10-28 RX ORDER — SUMATRIPTAN 50 MG/1
50 TABLET, FILM COATED ORAL
Qty: 10 TAB | Refills: 3 | Status: SHIPPED
Start: 2019-10-28 | End: 2020-04-05

## 2019-10-28 NOTE — PROGRESS NOTES
"CC:Diagnoses of Migraine with aura and without status migrainosus, not intractable and Encounter for other general counseling and advice on contraception were pertinent to this visit.    HISTORY OF PRESENT ILLNESS: Nathaly Luo is a 24 y.o. female established patient who presents today to discuss the following problems:      Headaches  This is a new problem to discuss.  When headaches are discussed with patient today she does describe \"seeing spots\" prior to her onset of headaches.    Reports  generalized headaches described as: throbbing with nausea, vomiting, photophobia and sonophobia, without radiation  Present since age 16 Usual frequency was monthly or less but increased to 4x weekly approx 1 month ago.  Recently started on Depo for BC.   Headaches are relieved by Excedrin  Known triggers include: patient is aware of none weather changes, poor sleep.   Current stressors include: work, finance, family. Usually sleeps 8 hours per night.   Denies difficulty with speech or swallowing, facial numbness or tingling, focal weakness. confusion, impaired alertness or consciousness.  Denies sore throat or cough, fever, sinus congestion, ear pain, tooth clenching or grinding.   Denies history of fever, weight loss, cancer, pregnancy, immunocompromised state or seizures.  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 Indications: q3m     • SUMAtriptan (IMITREX) 50 MG Tab Take 1 Tab by mouth Once PRN for Migraine for up to 1 dose. 10 Tab 3   • hydrocortisone 2.5 % Cream topical cream Apply 1 Application to affected area(s) 2 times a day. (Patient not taking: Reported on 10/28/2019) 1 Tube 0   • polyethylene glycol 3350 (MIRALAX) Powder Take 17 g by mouth every day. (Patient not taking: Reported on 10/28/2019) 1 Bottle 3     No current facility-administered medications for this visit.        Social History     Tobacco Use   • Smoking status: Never Smoker   • Smokeless tobacco: Never Used   Substance Use Topics   • Alcohol use: No   • Drug use: No     Social History     Social History Narrative   • Not on file       Family History   Problem Relation Age of Onset   • Diabetes Mother    • Autoimmune Disease Mother    • Cancer Maternal Aunt        ROS:     No fevers or weight loss  No vision changes or sore throat  No chest pain or shortness of breath  No bowel changes  No lower extremity edema  No suicidal ideation or panic attack          EXAM:   /86 (BP Location: Left arm, Patient Position: Sitting, BP Cuff Size: Adult)   Pulse 87   Temp 36.9 °C (98.5 °F)   Ht 1.524 m (5')   Wt 81.6 kg (180 lb)   SpO2 97%  Body mass index is 35.15 kg/m².    Constitutional: Well-developed and well-nourished. Not diaphoretic. No distress.   Skin: Skin is warm and dry. No rash noted.  Head: Atraumatic without lesions.  Eyes: Conjunctivae and extraocular motions are normal. Pupils are equal, round, and reactive to light. No scleral icterus.   Ears:  External ears unremarkable. Tympanic membranes clear and intact.  Nose: Nares patent. Mucosa without edema or erythema. No discharge. No facial tenderness.  Mouth/Throat: Tongue normal. Oropharynx is clear and moist. Posterior pharynx without erythema or exudates.  Neck: Supple,  trachea midline. No thyromegaly present. No cervical or supraclavicular lymphadenopathy.  Cardiovascular: Regular rate and rhythm.   Chest: Effort normal. Clear to auscultation throughout. No adventitious sounds.   Abdomen: Soft, non tender, and without distention. Active bowel sounds in all four quadrants. No rebound, guarding, masses or hepatosplenomegaly.  Extremities: No cyanosis, clubbing, erythema, nor edema.   Neurological: Alert and oriented x 3. Sensation intact.   Psychiatric:  Behavior, mood, and affect are appropriate.       ASSESSMENT/PLAN:    1. Migraine with aura and without status migrainosus, not intractable  New problem uncontrolled. Likely increased headaches r/t depo shot.  We have had a long discussion today about the safety of combined oral hormonal contraceptives in patients with migraine with aura.  Patient verbalized understanding.  We are going to send her to gynecology for discussion of other methods including the ParaGard IUD.  Triggers should be identified and avoided when possible - keep headache diary. Suspect increased stress as another trigger. Advised to sleep regular hours, eat small but frequent meals, hydrate appropriately( minimum of 40-60 oz per day), and exercise regularly (minimum 30 minutes, three times a week). Daily caffeine intake should be limited to less than 100 mg which she is already doing.  Eliminate foods that lower the threshold for migraine, that is, chocolate, aged and yellow cheese, caffeine, red wine, dark beer, shellfish, and meats processed with nitrates. Patient can consider acupuncture treatments to decrease severity/frequency. Suspect increased stress as another trigger - can benefit from relaxation techniques and maintaining a regular exercise routine    - SUMAtriptan (IMITREX) 50 MG Tab; Take 1 Tab by mouth Once PRN for Migraine for up to 1 dose.  Dispense: 10 Tab; Refill: 3    2. Encounter for other general counseling and advice on  contraception  Patient with h/o migraines with aura. Would like counseling r/t safest methods of birth control.   - REFERRAL TO GYNECOLOGY      No follow-ups on file.       Please note that this dictation was created using voice recognition software. I have made every reasonable attempt to correct obvious errors, but I expect that there are errors of grammar and possibly content that I did not discover before finalizing the note.

## 2019-12-13 ENCOUNTER — APPOINTMENT (OUTPATIENT)
Dept: MEDICAL GROUP | Facility: LAB | Age: 24
End: 2019-12-13
Payer: COMMERCIAL

## 2019-12-13 ENCOUNTER — TELEPHONE (OUTPATIENT)
Dept: MEDICAL GROUP | Facility: LAB | Age: 24
End: 2019-12-13

## 2019-12-13 NOTE — TELEPHONE ENCOUNTER
Phone Number Called: [phone] (home)     Call outcome: No voicemail is set up    Message: I spoke to Nathaly this morning, to let her know that Epic is down and will call her back when our system comes back up. Her voicemail is not set up. I made her an MA appointment for 12/20 at 8:15am for her Depo shot. I sent her a Mychart to confirm the appointment.

## 2019-12-17 ENCOUNTER — OFFICE VISIT (OUTPATIENT)
Dept: MEDICAL GROUP | Facility: LAB | Age: 24
End: 2019-12-17
Payer: COMMERCIAL

## 2019-12-17 DIAGNOSIS — Z30.42 ENCOUNTER FOR MANAGEMENT AND INJECTION OF DEPO-PROVERA: ICD-10-CM

## 2019-12-17 PROCEDURE — 96372 THER/PROPH/DIAG INJ SC/IM: CPT | Performed by: NURSE PRACTITIONER

## 2019-12-17 RX ORDER — MEDROXYPROGESTERONE ACETATE 150 MG/ML
150 INJECTION, SUSPENSION INTRAMUSCULAR ONCE
Status: COMPLETED | OUTPATIENT
Start: 2019-12-17 | End: 2019-12-17

## 2019-12-17 RX ORDER — MEDROXYPROGESTERONE ACETATE 150 MG/ML
150 INJECTION, SUSPENSION INTRAMUSCULAR ONCE
Qty: 1 ML | Refills: 0 | Status: SHIPPED | OUTPATIENT
Start: 2019-12-17 | End: 2019-12-17

## 2019-12-17 RX ADMIN — MEDROXYPROGESTERONE ACETATE 150 MG: 150 INJECTION, SUSPENSION INTRAMUSCULAR at 13:06

## 2019-12-17 NOTE — PROGRESS NOTES
Nathaly Luo is a 24 y.o. here for a Depo Provera Injection.     Date of last Depo Provera Injection: 09/20/19  Current date within therapeutic range?: Yes   Urine pregnancy test done (needed if out of date range): not performed  Date of office visit:12/17/19  Date of last pap (if > 21 years old)/ GYN exam: N/A  Dx: Contraceptive use    Order and dose verified by: LG/CECILIO  Patient tolerated injection and no adverse effects were observed or reported: Yes      Next injection due between 03/4/2020 and 03/18/2020.

## 2019-12-20 RX ORDER — MEDROXYPROGESTERONE ACETATE 150 MG/ML
150 INJECTION, SUSPENSION INTRAMUSCULAR ONCE
Qty: 1 ML | Refills: 0 | Status: CANCELLED | OUTPATIENT
Start: 2019-12-20 | End: 2019-12-20

## 2019-12-29 ENCOUNTER — OFFICE VISIT (OUTPATIENT)
Dept: URGENT CARE | Facility: CLINIC | Age: 24
End: 2019-12-29
Payer: COMMERCIAL

## 2019-12-29 ENCOUNTER — APPOINTMENT (OUTPATIENT)
Dept: RADIOLOGY | Facility: IMAGING CENTER | Age: 24
End: 2019-12-29
Attending: NURSE PRACTITIONER
Payer: COMMERCIAL

## 2019-12-29 VITALS
DIASTOLIC BLOOD PRESSURE: 84 MMHG | SYSTOLIC BLOOD PRESSURE: 136 MMHG | TEMPERATURE: 99.1 F | RESPIRATION RATE: 18 BRPM | OXYGEN SATURATION: 98 % | HEART RATE: 120 BPM | BODY MASS INDEX: 36.33 KG/M2 | WEIGHT: 186 LBS

## 2019-12-29 DIAGNOSIS — R05.9 COUGH: ICD-10-CM

## 2019-12-29 DIAGNOSIS — J22 LRTI (LOWER RESPIRATORY TRACT INFECTION): ICD-10-CM

## 2019-12-29 PROCEDURE — 99214 OFFICE O/P EST MOD 30 MIN: CPT | Performed by: NURSE PRACTITIONER

## 2019-12-29 PROCEDURE — 71046 X-RAY EXAM CHEST 2 VIEWS: CPT | Mod: TC | Performed by: NURSE PRACTITIONER

## 2019-12-29 RX ORDER — ALBUTEROL SULFATE 90 UG/1
2 AEROSOL, METERED RESPIRATORY (INHALATION) EVERY 6 HOURS PRN
Qty: 1 INHALER | Refills: 0 | Status: SHIPPED
Start: 2019-12-29 | End: 2020-04-16

## 2019-12-29 RX ORDER — DOXYCYCLINE HYCLATE 100 MG
100 TABLET ORAL 2 TIMES DAILY
Qty: 14 TAB | Refills: 0 | Status: SHIPPED | OUTPATIENT
Start: 2019-12-29 | End: 2020-01-05

## 2019-12-29 RX ORDER — METHYLPREDNISOLONE 4 MG/1
TABLET ORAL
Qty: 21 TAB | Refills: 0 | Status: SHIPPED
Start: 2019-12-29 | End: 2020-04-05

## 2019-12-29 ASSESSMENT — ENCOUNTER SYMPTOMS
SORE THROAT: 1
COUGH: 1
NAUSEA: 0
DIZZINESS: 0
VOMITING: 0
HEADACHES: 0
SHORTNESS OF BREATH: 1
CHILLS: 1
RHINORRHEA: 1
EYE PAIN: 0
FEVER: 1
MYALGIAS: 1

## 2019-12-29 NOTE — LETTER
December 29, 2019         Patient: Nathaly Luo   YOB: 1995   Date of Visit: 12/29/2019           To Whom it May Concern:    Nathaly Luo was seen in my clinic on 12/29/2019. She may return to work on 1/1/20.    If you have any questions or concerns, please don't hesitate to call.         Sincerely,           MILADIS Alvarez.  Electronically Signed

## 2019-12-30 NOTE — PROGRESS NOTES
Subjective:   Nathaly Luo  is a 24 y.o. female who presents for Cold Symptoms ( over 2 weeks-nasal drainage,productive cough,head pressure,ear pain,sore throat)        Cough   This is a new problem. Episode onset: 2weeks. The problem has been gradually worsening. The problem occurs constantly. The cough is productive of sputum and productive of blood-tinged sputum. Associated symptoms include chills, ear congestion, a fever, myalgias, nasal congestion, rhinorrhea, a sore throat and shortness of breath. Pertinent negatives include no chest pain, headaches or rash. Nothing aggravates the symptoms. She has tried OTC cough suppressant for the symptoms. The treatment provided no relief. There is no history of bronchitis.     Review of Systems   Constitutional: Positive for chills, fever and malaise/fatigue.   HENT: Positive for congestion, rhinorrhea and sore throat.    Eyes: Negative for pain.   Respiratory: Positive for cough and shortness of breath.    Cardiovascular: Negative for chest pain.   Gastrointestinal: Negative for nausea and vomiting.   Genitourinary: Negative for hematuria.   Musculoskeletal: Positive for myalgias.   Skin: Negative for rash.   Neurological: Negative for dizziness and headaches.     Allergies   Allergen Reactions   • Bolanos Swelling     Swollen lip when she eats any kind of bolanos   • Other Environmental Rash     Some perfumes and hand       Objective:   /84 (BP Location: Left arm)   Pulse (!) 120   Temp 37.3 °C (99.1 °F) (Temporal)   Resp 18   Wt 84.4 kg (186 lb)   SpO2 98%   BMI 36.33 kg/m²   Physical Exam  Vitals signs and nursing note reviewed.   Constitutional:       General: She is not in acute distress.     Appearance: She is well-developed.   HENT:      Head: Normocephalic and atraumatic.      Right Ear: Tympanic membrane and external ear normal.      Left Ear: Tympanic membrane and external ear normal.      Nose: Nose normal.      Right Sinus: No maxillary  sinus tenderness or frontal sinus tenderness.      Left Sinus: No maxillary sinus tenderness or frontal sinus tenderness.      Mouth/Throat:      Mouth: Mucous membranes are moist.      Pharynx: Uvula midline. No posterior oropharyngeal erythema.      Tonsils: No tonsillar exudate or tonsillar abscesses.   Eyes:      General:         Right eye: No discharge.         Left eye: No discharge.      Conjunctiva/sclera: Conjunctivae normal.      Pupils: Pupils are equal, round, and reactive to light.   Cardiovascular:      Rate and Rhythm: Normal rate and regular rhythm.      Heart sounds: No murmur.   Pulmonary:      Effort: Pulmonary effort is normal. No respiratory distress.      Breath sounds: Wheezing and rhonchi present.   Abdominal:      General: There is no distension.      Palpations: Abdomen is soft.      Tenderness: There is no tenderness.   Musculoskeletal: Normal range of motion.   Skin:     General: Skin is warm and dry.   Neurological:      General: No focal deficit present.      Mental Status: She is alert and oriented to person, place, and time. Mental status is at baseline.      Gait: Gait (gait at baseline) normal.   Psychiatric:         Judgment: Judgment normal.           Assessment/Plan:   1. Cough  - DX-CHEST-2 VIEWS; Future  - methylPREDNISolone (MEDROL DOSEPAK) 4 MG Tablet Therapy Pack; Follow schedule on package instructions.  Dispense: 21 Tab; Refill: 0    2. LRTI (lower respiratory tract infection)  - doxycycline (VIBRAMYCIN) 100 MG Tab; Take 1 Tab by mouth 2 times a day for 7 days.  Dispense: 14 Tab; Refill: 0  - albuterol 108 (90 Base) MCG/ACT Aero Soln inhalation aerosol; Inhale 2 Puffs by mouth every 6 hours as needed.  Dispense: 1 Inhaler; Refill: 0  - methylPREDNISolone (MEDROL DOSEPAK) 4 MG Tablet Therapy Pack; Follow schedule on package instructions.  Dispense: 21 Tab; Refill: 0  Xray results  No acute cardiopulmonary abnormality.    Advised to continue supportive care with Tylenol  and/or ibuprofen for fevers and discomfort. Increased fluids and electrolytes.  Patient given precautionary s/sx that mandate immediate follow up and evaluation in the ED. Advised of risks of not doing so.    DDX, Supportive care, and indications for immediate follow-up discussed with patient.    Instructed to return to clinic or nearest emergency department if we are not available for any change in condition, further concerns, or worsening of symptoms.    The patient demonstrated a good understanding and agreed with the treatment plan.

## 2020-03-05 ENCOUNTER — TELEPHONE (OUTPATIENT)
Dept: MEDICAL GROUP | Facility: LAB | Age: 25
End: 2020-03-05

## 2020-03-05 ENCOUNTER — NON-PROVIDER VISIT (OUTPATIENT)
Dept: MEDICAL GROUP | Facility: LAB | Age: 25
End: 2020-03-05
Payer: COMMERCIAL

## 2020-03-05 DIAGNOSIS — Z30.09 FAMILY PLANNING: ICD-10-CM

## 2020-03-05 RX ORDER — MEDROXYPROGESTERONE ACETATE 150 MG/ML
150 INJECTION, SUSPENSION INTRAMUSCULAR ONCE
Status: COMPLETED | OUTPATIENT
Start: 2020-03-05 | End: 2020-03-05

## 2020-03-05 RX ADMIN — MEDROXYPROGESTERONE ACETATE 150 MG: 150 INJECTION, SUSPENSION INTRAMUSCULAR at 09:35

## 2020-03-05 NOTE — PROGRESS NOTES
Nathaly Luo is a 24 y.o. here for a Depo Provera Injection.     Date of last Depo Provera Injection: 12/17/2019  Current date within therapeutic range?: Yes   Urine pregnancy test done (needed if out of date range): no  Date of office visit:3/5/2020  Date of last pap (if > 21 years old)/ GYN exam: N/A  Dx: Contraceptive use    Order and dose verified by: AS  Patient tolerated injection and no adverse effects were observed or reported: Yes    # of Administrations remaining in MAR: 0

## 2020-04-05 ENCOUNTER — APPOINTMENT (OUTPATIENT)
Dept: RADIOLOGY | Facility: MEDICAL CENTER | Age: 25
End: 2020-04-05
Attending: EMERGENCY MEDICINE
Payer: COMMERCIAL

## 2020-04-05 ENCOUNTER — HOSPITAL ENCOUNTER (EMERGENCY)
Facility: MEDICAL CENTER | Age: 25
End: 2020-04-05
Attending: EMERGENCY MEDICINE
Payer: COMMERCIAL

## 2020-04-05 VITALS
BODY MASS INDEX: 34.36 KG/M2 | DIASTOLIC BLOOD PRESSURE: 87 MMHG | HEART RATE: 107 BPM | TEMPERATURE: 98.1 F | SYSTOLIC BLOOD PRESSURE: 123 MMHG | OXYGEN SATURATION: 98 % | HEIGHT: 60 IN | WEIGHT: 175 LBS | RESPIRATION RATE: 16 BRPM

## 2020-04-05 DIAGNOSIS — N39.0 URINARY TRACT INFECTION WITHOUT HEMATURIA, SITE UNSPECIFIED: ICD-10-CM

## 2020-04-05 DIAGNOSIS — R50.9 FEVER, UNSPECIFIED FEVER CAUSE: ICD-10-CM

## 2020-04-05 LAB
ANION GAP SERPL CALC-SCNC: 15 MMOL/L (ref 7–16)
APPEARANCE UR: CLEAR
BACTERIA #/AREA URNS HPF: ABNORMAL /HPF
BASOPHILS # BLD AUTO: 0.5 % (ref 0–1.8)
BASOPHILS # BLD: 0.06 K/UL (ref 0–0.12)
BILIRUB UR QL STRIP.AUTO: NEGATIVE
BUN SERPL-MCNC: 16 MG/DL (ref 8–22)
CALCIUM SERPL-MCNC: 8.9 MG/DL (ref 8.5–10.5)
CHLORIDE SERPL-SCNC: 103 MMOL/L (ref 96–112)
CO2 SERPL-SCNC: 20 MMOL/L (ref 20–33)
COLOR UR: YELLOW
CREAT SERPL-MCNC: 0.73 MG/DL (ref 0.5–1.4)
EOSINOPHIL # BLD AUTO: 0.02 K/UL (ref 0–0.51)
EOSINOPHIL NFR BLD: 0.2 % (ref 0–6.9)
EPI CELLS #/AREA URNS HPF: NEGATIVE /HPF
ERYTHROCYTE [DISTWIDTH] IN BLOOD BY AUTOMATED COUNT: 37.7 FL (ref 35.9–50)
GLUCOSE SERPL-MCNC: 123 MG/DL (ref 65–99)
GLUCOSE UR STRIP.AUTO-MCNC: NEGATIVE MG/DL
HCG SERPL QL: NEGATIVE
HCT VFR BLD AUTO: 41.4 % (ref 37–47)
HGB BLD-MCNC: 14.1 G/DL (ref 12–16)
HYALINE CASTS #/AREA URNS LPF: ABNORMAL /LPF
IMM GRANULOCYTES # BLD AUTO: 0.05 K/UL (ref 0–0.11)
IMM GRANULOCYTES NFR BLD AUTO: 0.5 % (ref 0–0.9)
KETONES UR STRIP.AUTO-MCNC: NEGATIVE MG/DL
LEUKOCYTE ESTERASE UR QL STRIP.AUTO: ABNORMAL
LYMPHOCYTES # BLD AUTO: 0.53 K/UL (ref 1–4.8)
LYMPHOCYTES NFR BLD: 4.8 % (ref 22–41)
MCH RBC QN AUTO: 29.8 PG (ref 27–33)
MCHC RBC AUTO-ENTMCNC: 34.1 G/DL (ref 33.6–35)
MCV RBC AUTO: 87.5 FL (ref 81.4–97.8)
MICRO URNS: ABNORMAL
MONOCYTES # BLD AUTO: 0.76 K/UL (ref 0–0.85)
MONOCYTES NFR BLD AUTO: 6.9 % (ref 0–13.4)
NEUTROPHILS # BLD AUTO: 9.53 K/UL (ref 2–7.15)
NEUTROPHILS NFR BLD: 87.1 % (ref 44–72)
NITRITE UR QL STRIP.AUTO: NEGATIVE
NRBC # BLD AUTO: 0 K/UL
NRBC BLD-RTO: 0 /100 WBC
PH UR STRIP.AUTO: 5.5 [PH] (ref 5–8)
PLATELET # BLD AUTO: 210 K/UL (ref 164–446)
PMV BLD AUTO: 8.9 FL (ref 9–12.9)
POTASSIUM SERPL-SCNC: 3.4 MMOL/L (ref 3.6–5.5)
PROT UR QL STRIP: NEGATIVE MG/DL
RBC # BLD AUTO: 4.73 M/UL (ref 4.2–5.4)
RBC # URNS HPF: ABNORMAL /HPF
RBC UR QL AUTO: ABNORMAL
SODIUM SERPL-SCNC: 138 MMOL/L (ref 135–145)
SP GR UR STRIP.AUTO: 1.02
UROBILINOGEN UR STRIP.AUTO-MCNC: 0.2 MG/DL
WBC # BLD AUTO: 11 K/UL (ref 4.8–10.8)
WBC #/AREA URNS HPF: ABNORMAL /HPF

## 2020-04-05 PROCEDURE — 85025 COMPLETE CBC W/AUTO DIFF WBC: CPT

## 2020-04-05 PROCEDURE — 87186 SC STD MICRODIL/AGAR DIL: CPT

## 2020-04-05 PROCEDURE — 700102 HCHG RX REV CODE 250 W/ 637 OVERRIDE(OP): Performed by: EMERGENCY MEDICINE

## 2020-04-05 PROCEDURE — 700105 HCHG RX REV CODE 258: Performed by: EMERGENCY MEDICINE

## 2020-04-05 PROCEDURE — 96365 THER/PROPH/DIAG IV INF INIT: CPT

## 2020-04-05 PROCEDURE — 36415 COLL VENOUS BLD VENIPUNCTURE: CPT

## 2020-04-05 PROCEDURE — 84703 CHORIONIC GONADOTROPIN ASSAY: CPT

## 2020-04-05 PROCEDURE — A9270 NON-COVERED ITEM OR SERVICE: HCPCS | Performed by: EMERGENCY MEDICINE

## 2020-04-05 PROCEDURE — 87040 BLOOD CULTURE FOR BACTERIA: CPT

## 2020-04-05 PROCEDURE — 80048 BASIC METABOLIC PNL TOTAL CA: CPT

## 2020-04-05 PROCEDURE — 81001 URINALYSIS AUTO W/SCOPE: CPT

## 2020-04-05 PROCEDURE — 87086 URINE CULTURE/COLONY COUNT: CPT

## 2020-04-05 PROCEDURE — 700111 HCHG RX REV CODE 636 W/ 250 OVERRIDE (IP): Performed by: EMERGENCY MEDICINE

## 2020-04-05 PROCEDURE — 99284 EMERGENCY DEPT VISIT MOD MDM: CPT

## 2020-04-05 PROCEDURE — 87077 CULTURE AEROBIC IDENTIFY: CPT | Mod: 91

## 2020-04-05 RX ORDER — CEFDINIR 300 MG/1
300 CAPSULE ORAL 2 TIMES DAILY
Qty: 14 CAP | Refills: 0 | Status: SHIPPED | OUTPATIENT
Start: 2020-04-05 | End: 2020-04-12

## 2020-04-05 RX ORDER — ACETAMINOPHEN 325 MG/1
650 TABLET ORAL ONCE
Status: COMPLETED | OUTPATIENT
Start: 2020-04-05 | End: 2020-04-05

## 2020-04-05 RX ADMIN — ACETAMINOPHEN 650 MG: 325 TABLET, FILM COATED ORAL at 06:37

## 2020-04-05 RX ADMIN — CEFTRIAXONE SODIUM 2 G: 2 INJECTION, POWDER, FOR SOLUTION INTRAMUSCULAR; INTRAVENOUS at 07:26

## 2020-04-05 ASSESSMENT — FIBROSIS 4 INDEX: FIB4 SCORE: 0.4

## 2020-04-05 NOTE — ED TRIAGE NOTES
Chief Complaint   Patient presents with   • Seizure     pt had history of febrile seizures growing up, pt thinks she may of had a seizure. pt also presenting with a fever   • Fever     Pt was a walk in. Pt does work at [employer] in the kitchen. Pt states that she had a normal day, but stressful and that she did not eat today, except right before she went to bed. Then pt woke up with a headache and started to seize.    /88   Pulse (!) 142   Temp (!) 38.7 °C (101.7 °F) (Oral)   Resp 16   Ht 1.524 m (5')   Wt 79.4 kg (175 lb)   SpO2 95%   BMI 34.18 kg/m²

## 2020-04-05 NOTE — ED PROVIDER NOTES
ED Provider Note    Scribed for Remy Alfredo M.D. by Dwaine Soto. 4/5/2020  6:16 AM    Primary care provider: AKIKO Guillermo  Means of arrival: Walk-in  History obtained from: Patient  History limited by: None    CHIEF COMPLAINT  Chief Complaint   Patient presents with   • Seizure     pt had history of febrile seizures growing up, pt thinks she may of had a seizure. pt also presenting with a fever   • Fever       HPI  Nathaly Luo is a 24 y.o. female who presents to the Emergency Department status post suspected seizure with an acute, moderate fever onset 1 day ago. Patient states that she had not eaten today due to being busy with work and ate before going to bed. She notes that she woke up with a throbbing headache and started to shake, which she describes as a seizure. Her whole body was shaking, although she was awake during the entire episode. There are no known alleviating or exacerbating factors. Patient presents with a fever, generalized soreness, and her headache persists, but she denies any associated cough, shortness of breath, nausea, vomiting, chest pain, or abdominal pain. She notes that in the past week she had some lower back soreness and was concerned for a UTI, although she reports tat a OTC urine test was negative. Patient denies any past medical issues and does not take any daily medications.    PPE Note: I personally donned full PPE for all patient encounters during this visit, including being clean-shaven with an N95 respirator mask, gloves, gown, and goggles.     Scribe remained outside the patient's room and did not have any contact with the patient for the duration of patient encounter.       REVIEW OF SYSTEMS  Pertinent positives include suspected seizure, headache, fever, and generalized soreness.   Pertinent negatives include no cough, shortness of breath, nausea, vomiting, chest pain, or abdominal pain.    All other systems reviewed and negative. See HPI for  further details.       PAST MEDICAL HISTORY       SURGICAL HISTORY  patient denies any surgical history    SOCIAL HISTORY  Social History     Tobacco Use   • Smoking status: Never Smoker   • Smokeless tobacco: Never Used   Substance Use Topics   • Alcohol use: No   • Drug use: No      Social History     Substance and Sexual Activity   Drug Use No       FAMILY HISTORY  Family History   Problem Relation Age of Onset   • Diabetes Mother    • Autoimmune Disease Mother    • Cancer Maternal Aunt        CURRENT MEDICATIONS  Home Medications     Reviewed by Marguerite Hassan R.N. (Registered Nurse) on 04/05/20 at 0554  Med List Status: Complete   Medication Last Dose Status   albuterol 108 (90 Base) MCG/ACT Aero Soln inhalation aerosol  Active                ALLERGIES  Allergies   Allergen Reactions   • Bolanos Swelling     Swollen lip when she eats any kind of bolanos   • Other Environmental Rash     Some perfumes and hand        PHYSICAL EXAM  VITAL SIGNS: /88   Pulse (!) 142   Temp (!) 38.7 °C (101.7 °F) (Oral)   Resp 16   Ht 1.524 m (5')   Wt 79.4 kg (175 lb)   SpO2 95%   BMI 34.18 kg/m²     Nursing note and vitals reviewed.  Constitutional: Tactile fever. Well-developed and well-nourished. No distress.   HENT: Head is normocephalic and atraumatic. Oropharynx is clear and moist without exudate or erythema.   Eyes: Pupils are equal, round, and reactive to light. Conjunctiva are normal.   Cardiovascular: Tachycardic, Regular rhythm. No murmur heard. Normal radial pulses.  Pulmonary/Chest: Breath sounds normal. No wheezes or rales.   Abdominal: Soft and non-tender. No distention    Musculoskeletal: Extremities exhibit normal range of motion without edema or tenderness.   Neurological: Awake, alert and oriented to person, place, and time. No focal deficits noted.  Skin: Skin is warm and dry. No rash.   Psychiatric: Normal mood and affect. Appropriate for clinical situation.    DIAGNOSTIC STUDIES /  PROCEDURES    LABS  Results for orders placed or performed during the hospital encounter of 04/05/20   CBC WITH DIFFERENTIAL   Result Value Ref Range    WBC 11.0 (H) 4.8 - 10.8 K/uL    RBC 4.73 4.20 - 5.40 M/uL    Hemoglobin 14.1 12.0 - 16.0 g/dL    Hematocrit 41.4 37.0 - 47.0 %    MCV 87.5 81.4 - 97.8 fL    MCH 29.8 27.0 - 33.0 pg    MCHC 34.1 33.6 - 35.0 g/dL    RDW 37.7 35.9 - 50.0 fL    Platelet Count 210 164 - 446 K/uL    MPV 8.9 (L) 9.0 - 12.9 fL    Neutrophils-Polys 87.10 (H) 44.00 - 72.00 %    Lymphocytes 4.80 (L) 22.00 - 41.00 %    Monocytes 6.90 0.00 - 13.40 %    Eosinophils 0.20 0.00 - 6.90 %    Basophils 0.50 0.00 - 1.80 %    Immature Granulocytes 0.50 0.00 - 0.90 %    Nucleated RBC 0.00 /100 WBC    Neutrophils (Absolute) 9.53 (H) 2.00 - 7.15 K/uL    Lymphs (Absolute) 0.53 (L) 1.00 - 4.80 K/uL    Monos (Absolute) 0.76 0.00 - 0.85 K/uL    Eos (Absolute) 0.02 0.00 - 0.51 K/uL    Baso (Absolute) 0.06 0.00 - 0.12 K/uL    Immature Granulocytes (abs) 0.05 0.00 - 0.11 K/uL    NRBC (Absolute) 0.00 K/uL   BASIC METABOLIC PANEL   Result Value Ref Range    Sodium 138 135 - 145 mmol/L    Potassium 3.4 (L) 3.6 - 5.5 mmol/L    Chloride 103 96 - 112 mmol/L    Co2 20 20 - 33 mmol/L    Glucose 123 (H) 65 - 99 mg/dL    Bun 16 8 - 22 mg/dL    Creatinine 0.73 0.50 - 1.40 mg/dL    Calcium 8.9 8.5 - 10.5 mg/dL    Anion Gap 15.0 7.0 - 16.0   URINALYSIS CULTURE, IF INDICATED   Result Value Ref Range    Color Yellow     Character Clear     Specific Gravity 1.017 <1.035    Ph 5.5 5.0 - 8.0    Glucose Negative Negative mg/dL    Ketones Negative Negative mg/dL    Protein Negative Negative mg/dL    Bilirubin Negative Negative    Urobilinogen, Urine 0.2 Negative    Nitrite Negative Negative    Leukocyte Esterase Moderate (A) Negative    Occult Blood Small (A) Negative    Micro Urine Req Microscopic    HCG QUAL SERUM   Result Value Ref Range    Beta-Hcg Qualitative Serum Negative Negative   URINE MICROSCOPIC (W/UA)   Result Value Ref  Range    WBC 20-50 (A) /hpf    RBC 5-10 (A) /hpf    Bacteria Few (A) None /hpf    Epithelial Cells Negative /hpf    Hyaline Cast 0-2 /lpf   ESTIMATED GFR   Result Value Ref Range    GFR If African American >60 >60 mL/min/1.73 m 2    GFR If Non African American >60 >60 mL/min/1.73 m 2     All labs reviewed by me.    COURSE & MEDICAL DECISION MAKING  Nursing notes, VS, PMSFHx reviewed in chart.     6:16 AM - Patient seen and examined at bedside. She had an episode of full body shaking for which she was awake, which is more consistent with rigors than seizure. Patient will be treated with Tylenol 650 mg.  Ordered CBC with diff, BMP, blood culture, urinalysis culture, and HCG qual serum to evaluate her symptoms. The differential diagnoses include but are not limited to: viral syndrome, pneumonia, and UTI     7:02 AM - Patient tested positive for UTI, plan to administer Rocephin 2 g in ED then discharge with prescription for omnicef. The patient presents to day with signs and symptoms of a UTI. She is tolerating orals. Clinically well appearing.     I have discussed return precautions with the patient. I have asked her to return to the emergency department for new symptoms, worsening symptoms, flank pain, vomiting, or other concerns. In addition, she is to return to the emergency department in 8 hours for a recheck should her pain not completely resolve. The patient verbalized understanding.      Patient presents today with a fever.  She is had some dysuria.  He does not have any respiratory symptoms.  No significant abdominal tenderness.  Urinalysis is consistent with a urinary tract infection.  She will be treated with ceftriaxone in the emergency department given a prescription for Omnicef.    The patient will return for new or worsening symptoms and is stable at the time of discharge.    The patient is referred to a primary physician for blood pressure management, diabetic screening, and for all other preventative  health concerns.    DISPOSITION:  Patient will be discharged home in stable condition.    FOLLOW UP:  Rukhsana Álvarez, A.P.R.N.  [address]  [address]  [place] [id number] [phone]    Schedule an appointment as soon as possible for a visit       [facility], Emergency Dept  [address]  [place] [id number] [phone]    If symptoms worsen      OUTPATIENT MEDICATIONS:  Discharge Medication List as of 4/5/2020  8:04 AM      START taking these medications    Details   cefdinir (OMNICEF) 300 MG Cap Take 1 Cap by mouth 2 times a day for 7 days., Disp-14 Cap, R-0, Normal               FINAL IMPRESSION  1. Urinary tract infection without hematuria, site unspecified    2. Fever, unspecified fever cause          Dwaine WELLER (Scrgely), am scribing for, and in the presence of, Remy Alfredo M.D..    Electronically signed by: Dwaine Soto (Aniayibminal), 4/5/2020    IRemy M.D. personally performed the services described in this documentation, as scribed by Dwaine Soto in my presence, and it is both accurate and complete. C.     The note accurately reflects work and decisions made by me.  Remy Alfredo M.D.  4/5/2020  12:32 PM

## 2020-04-06 ENCOUNTER — HOSPITAL ENCOUNTER (EMERGENCY)
Facility: MEDICAL CENTER | Age: 25
End: 2020-04-06
Attending: EMERGENCY MEDICINE
Payer: COMMERCIAL

## 2020-04-06 VITALS
BODY MASS INDEX: 35.32 KG/M2 | SYSTOLIC BLOOD PRESSURE: 124 MMHG | OXYGEN SATURATION: 96 % | RESPIRATION RATE: 20 BRPM | HEIGHT: 60 IN | WEIGHT: 179.9 LBS | TEMPERATURE: 97.9 F | HEART RATE: 97 BPM | DIASTOLIC BLOOD PRESSURE: 60 MMHG

## 2020-04-06 DIAGNOSIS — R78.81 BACTEREMIA: ICD-10-CM

## 2020-04-06 DIAGNOSIS — N30.00 ACUTE CYSTITIS WITHOUT HEMATURIA: ICD-10-CM

## 2020-04-06 LAB
ANION GAP SERPL CALC-SCNC: 13 MMOL/L (ref 7–16)
APPEARANCE UR: CLEAR
BACTERIA #/AREA URNS HPF: NEGATIVE /HPF
BASOPHILS # BLD AUTO: 0.4 % (ref 0–1.8)
BASOPHILS # BLD: 0.04 K/UL (ref 0–0.12)
BILIRUB UR QL STRIP.AUTO: NEGATIVE
BUN SERPL-MCNC: 13 MG/DL (ref 8–22)
CALCIUM SERPL-MCNC: 8.9 MG/DL (ref 8.5–10.5)
CHLORIDE SERPL-SCNC: 106 MMOL/L (ref 96–112)
CO2 SERPL-SCNC: 20 MMOL/L (ref 20–33)
COLOR UR: YELLOW
CREAT SERPL-MCNC: 0.62 MG/DL (ref 0.5–1.4)
EOSINOPHIL # BLD AUTO: 0.05 K/UL (ref 0–0.51)
EOSINOPHIL NFR BLD: 0.4 % (ref 0–6.9)
EPI CELLS #/AREA URNS HPF: NEGATIVE /HPF
ERYTHROCYTE [DISTWIDTH] IN BLOOD BY AUTOMATED COUNT: 39 FL (ref 35.9–50)
GLUCOSE SERPL-MCNC: 92 MG/DL (ref 65–99)
GLUCOSE UR STRIP.AUTO-MCNC: NEGATIVE MG/DL
HCT VFR BLD AUTO: 39 % (ref 37–47)
HGB BLD-MCNC: 13.4 G/DL (ref 12–16)
HYALINE CASTS #/AREA URNS LPF: ABNORMAL /LPF
IMM GRANULOCYTES # BLD AUTO: 0.04 K/UL (ref 0–0.11)
IMM GRANULOCYTES NFR BLD AUTO: 0.4 % (ref 0–0.9)
KETONES UR STRIP.AUTO-MCNC: NEGATIVE MG/DL
LEUKOCYTE ESTERASE UR QL STRIP.AUTO: NEGATIVE
LYMPHOCYTES # BLD AUTO: 1.39 K/UL (ref 1–4.8)
LYMPHOCYTES NFR BLD: 12.3 % (ref 22–41)
MCH RBC QN AUTO: 30.6 PG (ref 27–33)
MCHC RBC AUTO-ENTMCNC: 34.4 G/DL (ref 33.6–35)
MCV RBC AUTO: 89 FL (ref 81.4–97.8)
MICRO URNS: ABNORMAL
MONOCYTES # BLD AUTO: 1.31 K/UL (ref 0–0.85)
MONOCYTES NFR BLD AUTO: 11.6 % (ref 0–13.4)
NEUTROPHILS # BLD AUTO: 8.43 K/UL (ref 2–7.15)
NEUTROPHILS NFR BLD: 74.9 % (ref 44–72)
NITRITE UR QL STRIP.AUTO: NEGATIVE
NRBC # BLD AUTO: 0 K/UL
NRBC BLD-RTO: 0 /100 WBC
PH UR STRIP.AUTO: 6 [PH] (ref 5–8)
PLATELET # BLD AUTO: 244 K/UL (ref 164–446)
PMV BLD AUTO: 9.2 FL (ref 9–12.9)
POTASSIUM SERPL-SCNC: 3.8 MMOL/L (ref 3.6–5.5)
PROT UR QL STRIP: NEGATIVE MG/DL
RBC # BLD AUTO: 4.38 M/UL (ref 4.2–5.4)
RBC # URNS HPF: ABNORMAL /HPF
RBC UR QL AUTO: ABNORMAL
SODIUM SERPL-SCNC: 139 MMOL/L (ref 135–145)
SP GR UR STRIP.AUTO: 1.01
UROBILINOGEN UR STRIP.AUTO-MCNC: 0.2 MG/DL
WBC # BLD AUTO: 11.3 K/UL (ref 4.8–10.8)
WBC #/AREA URNS HPF: ABNORMAL /HPF

## 2020-04-06 PROCEDURE — 81001 URINALYSIS AUTO W/SCOPE: CPT

## 2020-04-06 PROCEDURE — 80048 BASIC METABOLIC PNL TOTAL CA: CPT

## 2020-04-06 PROCEDURE — 85025 COMPLETE CBC W/AUTO DIFF WBC: CPT

## 2020-04-06 PROCEDURE — 99283 EMERGENCY DEPT VISIT LOW MDM: CPT

## 2020-04-06 RX ORDER — ACETAMINOPHEN 325 MG/1
650 TABLET ORAL EVERY 4 HOURS PRN
COMMUNITY
End: 2020-04-16

## 2020-04-06 RX ORDER — CIPROFLOXACIN 500 MG/1
500 TABLET, FILM COATED ORAL 2 TIMES DAILY
Qty: 20 TAB | Refills: 0 | Status: ON HOLD | OUTPATIENT
Start: 2020-04-06 | End: 2020-04-18 | Stop reason: SDUPTHER

## 2020-04-06 ASSESSMENT — FIBROSIS 4 INDEX: FIB4 SCORE: 0.58

## 2020-04-06 NOTE — ED TRIAGE NOTES
Ambulates to triage  Chief Complaint   Patient presents with   • Follow-Up     UTI     Pt was here 2 days ago for a UTI, was sent home with abx. Called to come back in today because her blood cultures were positive for gram negative rods. Pt said she fools much better today, has been taking her abx as prescribed, took tylenol this morning, but denies fevers.

## 2020-04-06 NOTE — ED NOTES
Outpatient pharmacy called regarding ciprofloxacin script - sig x 14 days vs qty #20. Rx clarified to x 14 days, dispense #28.    Keke Sargent, PharmD

## 2020-04-06 NOTE — LETTER
[facility], EMERGENCY DEPT  [address]  [place] [address]  [phone]     April 6, 2020    Patient: Nathaly Luo   YOB: 1995   Date of Visit: 4/6/2020       To Whom It May Concern: Pt will be excused for 3 days starting today, and is okay to return to work on 4/9.     Nathaly Luo was seen and treated in our department on 4/6/2020.     Sincerely,     Eliel Mcghee R.N.

## 2020-04-06 NOTE — ED NOTES
"ED Positive Culture Follow-up/Notification Note:    Date: 4/6/2020     Patient seen in the ED on 4/5/2020 for rigors and fever. Does also note lower back soreness and possible UTI.  1. Urinary tract infection without hematuria, site unspecified    2. Fever, unspecified fever cause       Given ceftriaxone 2 g IV in the ER on 4/5/2020 at 0726    Discharge Medication List as of 4/5/2020  8:04 AM      START taking these medications    Details   cefdinir (OMNICEF) 300 MG Cap Take 1 Cap by mouth 2 times a day for 7 days., Disp-14 Cap, R-0, Normal             Allergies: Daigle and Other environmental     Vitals:    04/05/20 0542 04/05/20 0551 04/05/20 0829   BP: 146/88  123/87   Pulse: (!) 142  (!) 107   Resp: 16  16   Temp: (!) 38.7 °C (101.7 °F)  36.7 °C (98.1 °F)   TempSrc: Oral  Temporal   SpO2: 95%  98%   Weight:  79.4 kg (175 lb)    Height:  1.524 m (5')        Final cultures:   Results     Procedure Component Value Units Date/Time    BLOOD CULTURE [[id number]]  (Abnormal) Collected:  04/05/20 0644    Order Status:  Completed Specimen:  Blood from Peripheral Updated:  04/05/20 2220     Significant Indicator POS     Source BLD     Site PERIPHERAL     Culture Result Growth detected by Bactec instrument. 04/05/2020  22:20  Gram Stain: Gram negative rods.      Narrative:       Per Hospital Policy: Only change Specimen Src: to \"Line\" if  specified by physician order.  Right Forearm/Arm    BLOOD CULTURE [[id number]]  (Abnormal) Collected:  04/05/20 0640    Order Status:  Completed Specimen:  Blood from Peripheral Updated:  04/05/20 2049     Significant Indicator POS     Source BLD     Site PERIPHERAL     Culture Result Growth detected by Bactec instrument. 04/05/2020  20:43  Gram Stain: Gram negative rods.      Narrative:       CALL  [place]  ER tel. ,  CALLED  ER tel.  04/05/2020, 20:49, RB PERF. RESULTS CALLED TO: Pharmacist  [phone]  Per Hospital Policy: Only change Specimen Src: to \"Line\" if  specified by physician " order.  Left Forearm/Arm    URINE CULTURE(NEW) [[id number]] Collected:  04/05/20 0635    Order Status:  Completed Updated:  04/05/20 0647    URINALYSIS CULTURE, IF INDICATED [[id number]]  (Abnormal) Collected:  04/05/20 0635    Order Status:  Completed Specimen:  Urine Updated:  04/05/20 0646     Color Yellow     Character Clear     Specific Gravity 1.017     Ph 5.5     Glucose Negative mg/dL      Ketones Negative mg/dL      Protein Negative mg/dL      Bilirubin Negative     Urobilinogen, Urine 0.2     Nitrite Negative     Leukocyte Esterase Moderate     Occult Blood Small     Micro Urine Req Microscopic          Plan:   Patient's blood cultures are now positive with gram negative rods, likely from a urinary source. She did receive Ceftriaxone 24 hours ago and was discharged on cefdinir. However, cefdinir does not achieve high concentrations due to poor oral bioavailability. I have attempted to contact the patient to notify of results, but there was no answer. I have left her a message to return my call as soon as possible. If she returns my call, I will encourage her to return to the ER for further evaluation and IV antibiotics. I will try to contact her later in the day if I do not hear from her soon.    Keke Sargent, PharmD      4/6/2020 1016 I have phoned the patient again and there was no answer. Will try again later. I have also sent her a message through My Chart.    4/6/2020 1200 Patient returned call and was encouraged to return to the ER for further evaluation. I have informed her of the risk for sepsis if infection untreated. She has agreed to return to the ER as soon as possible today.

## 2020-04-07 LAB
BACTERIA BLD CULT: ABNORMAL
BACTERIA UR CULT: ABNORMAL
BACTERIA UR CULT: ABNORMAL
SIGNIFICANT IND 70042: ABNORMAL
SITE SITE: ABNORMAL
SOURCE SOURCE: ABNORMAL

## 2020-04-16 ENCOUNTER — TELEMEDICINE (OUTPATIENT)
Dept: MEDICAL GROUP | Facility: LAB | Age: 25
End: 2020-04-16
Payer: COMMERCIAL

## 2020-04-16 ENCOUNTER — APPOINTMENT (OUTPATIENT)
Dept: RADIOLOGY | Facility: MEDICAL CENTER | Age: 25
DRG: 872 | End: 2020-04-16
Attending: EMERGENCY MEDICINE
Payer: COMMERCIAL

## 2020-04-16 ENCOUNTER — HOSPITAL ENCOUNTER (INPATIENT)
Facility: MEDICAL CENTER | Age: 25
LOS: 2 days | DRG: 872 | End: 2020-04-18
Attending: EMERGENCY MEDICINE | Admitting: INTERNAL MEDICINE
Payer: COMMERCIAL

## 2020-04-16 VITALS — BODY MASS INDEX: 35.13 KG/M2 | HEIGHT: 60 IN | HEART RATE: 95 BPM

## 2020-04-16 DIAGNOSIS — R78.81 BACTEREMIA: ICD-10-CM

## 2020-04-16 DIAGNOSIS — N39.0 URINARY TRACT INFECTION WITHOUT HEMATURIA, SITE UNSPECIFIED: ICD-10-CM

## 2020-04-16 LAB
ALBUMIN SERPL BCP-MCNC: 4.8 G/DL (ref 3.2–4.9)
ALBUMIN/GLOB SERPL: 1.2 G/DL
ALP SERPL-CCNC: 56 U/L (ref 30–99)
ALT SERPL-CCNC: 12 U/L (ref 2–50)
ANION GAP SERPL CALC-SCNC: 18 MMOL/L (ref 7–16)
APPEARANCE UR: CLEAR
AST SERPL-CCNC: 23 U/L (ref 12–45)
BASOPHILS # BLD AUTO: 0.8 % (ref 0–1.8)
BASOPHILS # BLD: 0.11 K/UL (ref 0–0.12)
BILIRUB SERPL-MCNC: 0.3 MG/DL (ref 0.1–1.5)
BILIRUB UR QL STRIP.AUTO: NEGATIVE
BUN SERPL-MCNC: 16 MG/DL (ref 8–22)
CALCIUM SERPL-MCNC: 10.1 MG/DL (ref 8.5–10.5)
CHLORIDE SERPL-SCNC: 99 MMOL/L (ref 96–112)
CO2 SERPL-SCNC: 22 MMOL/L (ref 20–33)
COLOR UR: YELLOW
CREAT SERPL-MCNC: 0.66 MG/DL (ref 0.5–1.4)
EKG IMPRESSION: NORMAL
EOSINOPHIL # BLD AUTO: 0.13 K/UL (ref 0–0.51)
EOSINOPHIL NFR BLD: 1 % (ref 0–6.9)
ERYTHROCYTE [DISTWIDTH] IN BLOOD BY AUTOMATED COUNT: 38.6 FL (ref 35.9–50)
GLOBULIN SER CALC-MCNC: 3.9 G/DL (ref 1.9–3.5)
GLUCOSE SERPL-MCNC: 80 MG/DL (ref 65–99)
GLUCOSE UR STRIP.AUTO-MCNC: NEGATIVE MG/DL
HCG SERPL QL: NEGATIVE
HCT VFR BLD AUTO: 46.7 % (ref 37–47)
HGB BLD-MCNC: 15.9 G/DL (ref 12–16)
IMM GRANULOCYTES # BLD AUTO: 0.08 K/UL (ref 0–0.11)
IMM GRANULOCYTES NFR BLD AUTO: 0.6 % (ref 0–0.9)
INR PPP: 0.95 (ref 0.87–1.13)
KETONES UR STRIP.AUTO-MCNC: NEGATIVE MG/DL
LACTATE BLD-SCNC: 1.7 MMOL/L (ref 0.5–2)
LACTATE BLD-SCNC: 2.5 MMOL/L (ref 0.5–2)
LEUKOCYTE ESTERASE UR QL STRIP.AUTO: NEGATIVE
LYMPHOCYTES # BLD AUTO: 3.16 K/UL (ref 1–4.8)
LYMPHOCYTES NFR BLD: 24.2 % (ref 22–41)
MCH RBC QN AUTO: 29.9 PG (ref 27–33)
MCHC RBC AUTO-ENTMCNC: 34 G/DL (ref 33.6–35)
MCV RBC AUTO: 87.9 FL (ref 81.4–97.8)
MICRO URNS: NORMAL
MONOCYTES # BLD AUTO: 0.63 K/UL (ref 0–0.85)
MONOCYTES NFR BLD AUTO: 4.8 % (ref 0–13.4)
NEUTROPHILS # BLD AUTO: 8.94 K/UL (ref 2–7.15)
NEUTROPHILS NFR BLD: 68.6 % (ref 44–72)
NITRITE UR QL STRIP.AUTO: NEGATIVE
NRBC # BLD AUTO: 0 K/UL
NRBC BLD-RTO: 0 /100 WBC
PH UR STRIP.AUTO: 6 [PH] (ref 5–8)
PLATELET # BLD AUTO: 376 K/UL (ref 164–446)
PMV BLD AUTO: 8.8 FL (ref 9–12.9)
POTASSIUM SERPL-SCNC: 4.1 MMOL/L (ref 3.6–5.5)
PROT SERPL-MCNC: 8.7 G/DL (ref 6–8.2)
PROT UR QL STRIP: NEGATIVE MG/DL
PROTHROMBIN TIME: 12.8 SEC (ref 12–14.6)
RBC # BLD AUTO: 5.31 M/UL (ref 4.2–5.4)
RBC UR QL AUTO: NEGATIVE
SODIUM SERPL-SCNC: 139 MMOL/L (ref 135–145)
SP GR UR STRIP.AUTO: 1
UROBILINOGEN UR STRIP.AUTO-MCNC: 0.2 MG/DL
WBC # BLD AUTO: 13.1 K/UL (ref 4.8–10.8)

## 2020-04-16 PROCEDURE — 87040 BLOOD CULTURE FOR BACTERIA: CPT | Mod: 91

## 2020-04-16 PROCEDURE — 71045 X-RAY EXAM CHEST 1 VIEW: CPT

## 2020-04-16 PROCEDURE — 80053 COMPREHEN METABOLIC PANEL: CPT

## 2020-04-16 PROCEDURE — 85025 COMPLETE CBC W/AUTO DIFF WBC: CPT

## 2020-04-16 PROCEDURE — 87086 URINE CULTURE/COLONY COUNT: CPT

## 2020-04-16 PROCEDURE — 74177 CT ABD & PELVIS W/CONTRAST: CPT

## 2020-04-16 PROCEDURE — 85610 PROTHROMBIN TIME: CPT

## 2020-04-16 PROCEDURE — 93005 ELECTROCARDIOGRAM TRACING: CPT

## 2020-04-16 PROCEDURE — 81003 URINALYSIS AUTO W/O SCOPE: CPT

## 2020-04-16 PROCEDURE — 96365 THER/PROPH/DIAG IV INF INIT: CPT

## 2020-04-16 PROCEDURE — 93005 ELECTROCARDIOGRAM TRACING: CPT | Performed by: EMERGENCY MEDICINE

## 2020-04-16 PROCEDURE — 700105 HCHG RX REV CODE 258: Performed by: EMERGENCY MEDICINE

## 2020-04-16 PROCEDURE — 99213 OFFICE O/P EST LOW 20 MIN: CPT | Mod: CR,95 | Performed by: FAMILY MEDICINE

## 2020-04-16 PROCEDURE — 83605 ASSAY OF LACTIC ACID: CPT | Mod: 91

## 2020-04-16 PROCEDURE — 770006 HCHG ROOM/CARE - MED/SURG/GYN SEMI*

## 2020-04-16 PROCEDURE — 700111 HCHG RX REV CODE 636 W/ 250 OVERRIDE (IP): Performed by: EMERGENCY MEDICINE

## 2020-04-16 PROCEDURE — 84703 CHORIONIC GONADOTROPIN ASSAY: CPT

## 2020-04-16 PROCEDURE — 700117 HCHG RX CONTRAST REV CODE 255: Performed by: EMERGENCY MEDICINE

## 2020-04-16 PROCEDURE — 99223 1ST HOSP IP/OBS HIGH 75: CPT | Performed by: INTERNAL MEDICINE

## 2020-04-16 PROCEDURE — 99285 EMERGENCY DEPT VISIT HI MDM: CPT

## 2020-04-16 RX ORDER — SODIUM CHLORIDE, SODIUM LACTATE, POTASSIUM CHLORIDE, AND CALCIUM CHLORIDE .6; .31; .03; .02 G/100ML; G/100ML; G/100ML; G/100ML
1000 INJECTION, SOLUTION INTRAVENOUS
Status: DISCONTINUED | OUTPATIENT
Start: 2020-04-16 | End: 2020-04-18 | Stop reason: HOSPADM

## 2020-04-16 RX ORDER — ACETAMINOPHEN 325 MG/1
650 TABLET ORAL EVERY 6 HOURS PRN
Status: DISCONTINUED | OUTPATIENT
Start: 2020-04-16 | End: 2020-04-18 | Stop reason: HOSPADM

## 2020-04-16 RX ORDER — POLYETHYLENE GLYCOL 3350 17 G/17G
1 POWDER, FOR SOLUTION ORAL
Status: DISCONTINUED | OUTPATIENT
Start: 2020-04-16 | End: 2020-04-18 | Stop reason: HOSPADM

## 2020-04-16 RX ORDER — SODIUM CHLORIDE, SODIUM LACTATE, POTASSIUM CHLORIDE, AND CALCIUM CHLORIDE .6; .31; .03; .02 G/100ML; G/100ML; G/100ML; G/100ML
30 INJECTION, SOLUTION INTRAVENOUS
Status: DISCONTINUED | OUTPATIENT
Start: 2020-04-16 | End: 2020-04-18 | Stop reason: HOSPADM

## 2020-04-16 RX ORDER — OXYCODONE HYDROCHLORIDE 5 MG/1
5 TABLET ORAL
Status: DISCONTINUED | OUTPATIENT
Start: 2020-04-16 | End: 2020-04-18 | Stop reason: HOSPADM

## 2020-04-16 RX ORDER — ACETAMINOPHEN 500 MG
1000 TABLET ORAL EVERY 6 HOURS PRN
COMMUNITY

## 2020-04-16 RX ORDER — ONDANSETRON 4 MG/1
4 TABLET, ORALLY DISINTEGRATING ORAL EVERY 4 HOURS PRN
Status: DISCONTINUED | OUTPATIENT
Start: 2020-04-16 | End: 2020-04-18 | Stop reason: HOSPADM

## 2020-04-16 RX ORDER — SODIUM CHLORIDE, SODIUM LACTATE, POTASSIUM CHLORIDE, CALCIUM CHLORIDE 600; 310; 30; 20 MG/100ML; MG/100ML; MG/100ML; MG/100ML
INJECTION, SOLUTION INTRAVENOUS CONTINUOUS
Status: DISCONTINUED | OUTPATIENT
Start: 2020-04-16 | End: 2020-04-18 | Stop reason: HOSPADM

## 2020-04-16 RX ORDER — AMOXICILLIN 250 MG
2 CAPSULE ORAL 2 TIMES DAILY
Status: DISCONTINUED | OUTPATIENT
Start: 2020-04-16 | End: 2020-04-18 | Stop reason: HOSPADM

## 2020-04-16 RX ORDER — PROMETHAZINE HYDROCHLORIDE 25 MG/1
12.5-25 TABLET ORAL EVERY 4 HOURS PRN
Status: DISCONTINUED | OUTPATIENT
Start: 2020-04-16 | End: 2020-04-18 | Stop reason: HOSPADM

## 2020-04-16 RX ORDER — BISACODYL 10 MG
10 SUPPOSITORY, RECTAL RECTAL
Status: DISCONTINUED | OUTPATIENT
Start: 2020-04-16 | End: 2020-04-18 | Stop reason: HOSPADM

## 2020-04-16 RX ORDER — CEFDINIR 300 MG/1
300 CAPSULE ORAL 2 TIMES DAILY
COMMUNITY
End: 2020-04-16

## 2020-04-16 RX ORDER — PROMETHAZINE HYDROCHLORIDE 12.5 MG/1
12.5-25 SUPPOSITORY RECTAL EVERY 4 HOURS PRN
Status: DISCONTINUED | OUTPATIENT
Start: 2020-04-16 | End: 2020-04-18 | Stop reason: HOSPADM

## 2020-04-16 RX ORDER — OXYCODONE HYDROCHLORIDE 10 MG/1
10 TABLET ORAL
Status: DISCONTINUED | OUTPATIENT
Start: 2020-04-16 | End: 2020-04-18 | Stop reason: HOSPADM

## 2020-04-16 RX ORDER — ENALAPRILAT 1.25 MG/ML
1.25 INJECTION INTRAVENOUS EVERY 6 HOURS PRN
Status: DISCONTINUED | OUTPATIENT
Start: 2020-04-16 | End: 2020-04-18 | Stop reason: HOSPADM

## 2020-04-16 RX ORDER — PROCHLORPERAZINE EDISYLATE 5 MG/ML
5-10 INJECTION INTRAMUSCULAR; INTRAVENOUS EVERY 4 HOURS PRN
Status: DISCONTINUED | OUTPATIENT
Start: 2020-04-16 | End: 2020-04-18 | Stop reason: HOSPADM

## 2020-04-16 RX ORDER — ONDANSETRON 2 MG/ML
4 INJECTION INTRAMUSCULAR; INTRAVENOUS EVERY 4 HOURS PRN
Status: DISCONTINUED | OUTPATIENT
Start: 2020-04-16 | End: 2020-04-18 | Stop reason: HOSPADM

## 2020-04-16 RX ORDER — HYDROMORPHONE HYDROCHLORIDE 1 MG/ML
0.5 INJECTION, SOLUTION INTRAMUSCULAR; INTRAVENOUS; SUBCUTANEOUS
Status: DISCONTINUED | OUTPATIENT
Start: 2020-04-16 | End: 2020-04-18 | Stop reason: HOSPADM

## 2020-04-16 RX ORDER — SODIUM CHLORIDE 9 MG/ML
1000 INJECTION, SOLUTION INTRAVENOUS ONCE
Status: COMPLETED | OUTPATIENT
Start: 2020-04-16 | End: 2020-04-16

## 2020-04-16 RX ADMIN — CEFTRIAXONE SODIUM 2 G: 2 INJECTION, POWDER, FOR SOLUTION INTRAMUSCULAR; INTRAVENOUS at 21:04

## 2020-04-16 RX ADMIN — SODIUM CHLORIDE 1000 ML: 9 INJECTION, SOLUTION INTRAVENOUS at 19:00

## 2020-04-16 RX ADMIN — IOHEXOL 100 ML: 350 INJECTION, SOLUTION INTRAVENOUS at 19:57

## 2020-04-16 ASSESSMENT — ENCOUNTER SYMPTOMS
DEPRESSION: 0
WEAKNESS: 0
COUGH: 0
STRIDOR: 0
DIZZINESS: 0
FALLS: 0
CONSTIPATION: 0
VOMITING: 0
NAUSEA: 0
MYALGIAS: 0
HEADACHES: 0
PALPITATIONS: 0
FEVER: 0
CHILLS: 1
SPUTUM PRODUCTION: 0
FLANK PAIN: 1
SHORTNESS OF BREATH: 0
ABDOMINAL PAIN: 0
LOSS OF CONSCIOUSNESS: 0
DIARRHEA: 0
TINGLING: 0

## 2020-04-16 ASSESSMENT — FIBROSIS 4 INDEX: FIB4 SCORE: 0.5

## 2020-04-16 NOTE — PROGRESS NOTES
Telemedicine Visit: Established Patient     This encounter was conducted via Zoom .   Verbal consent was obtained. Patient's identity was verified.    Subjective:   CC: UTI bacteremia  Nathaly Luo is a 24 y.o. female presenting for evaluation and management of:    UTI bacteremia:  Patient here for video appointment for follow-up ER visit on 5 April as well as the sixth.  She was diagnosed with a UTI on her initial ER visit however positive blood cultures x2 returned positive for E. coli.  She was evaluated secondarily in the ER and was found to be stable and improving as she had received a ceftriaxone injection and was discharged on Omnicef.  On her second visit she was discharged per pharmacy on ciprofloxacin.  She states she will complete her ciprofloxacin on Monday but she continues to have UTI symptoms and currently she has feelings of heart palpitations and heart racing as well as fever.  She also states she has significant pain in her left leg.  She is attempted to get vitals done in the hospital as she is currently at [facility] but no one will do her vitals.  She also was unable to take days off to recover from her sepsis which she met the criteria for on the fifth, given she had an elevated white count, tachycardia, and elevated temperature and because she was not admitted, she was not allowed to take off to recover.  Even after her blood cultures came back positive she states she was not allowed to take off.  Patient stated that her either she comes into work or resigned.    ROS   Denies any recent fevers or chills. No nausea or vomiting. No chest pains or shortness of breath.     Allergies   Allergen Reactions   • Bolanos Swelling     Swollen lip when she eats any kind of bolanos   • Other Environmental Rash     Some perfumes and hand        Current medicines (including changes today)  Current Outpatient Medications   Medication Sig Dispense Refill   • acetaminophen (TYLENOL) 325 MG  Tab Take 650 mg by mouth every four hours as needed.     • ciprofloxacin (CIPRO) 500 MG Tab Take 1 Tab by mouth 2 times a day for 14 days. 20 Tab 0   • albuterol 108 (90 Base) MCG/ACT Aero Soln inhalation aerosol Inhale 2 Puffs by mouth every 6 hours as needed. 1 Inhaler 0     No current facility-administered medications for this visit.        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  She  has no past surgical history on file.       Objective:   Vitals obtained by patient:  Unable to obtain    Physical Exam:  Constitutional: Alert, mild distress, well-groomed.  Skin: No rashes in visible areas.  Eye: Round. Conjunctiva clear, lids normal. No icterus.   ENMT: Lips pink without lesions, good dentition, moist mucous membranes. Phonation normal.  Neck: No masses, no thyromegaly. Moves freely without pain.  CV: Pulse as reported by patient  Respiratory: Unlabored respiratory effort, no cough or audible wheeze  Psych: Alert and oriented x3, normal affect and mood.       Assessment and Plan:   The following treatment plan was discussed:     1. Urinary tract infection without hematuria, site unspecified  Patient seems that she is in mild distress and that she feels like her heart is racing and she feels febrile.  Given I do not have a set of vitals and that at some point she was septic I rather her be evaluated face-to-face.  She is currently at work at [employer] and the closest to her before 5:00 is the ER.  I would like the patient to be evaluated in the ER given this video visit is not sufficient with her  symptoms.  Follow-up ER visit.      Follow-up: No follow-ups on file.

## 2020-04-17 PROBLEM — A41.9 SEPSIS (HCC): Status: ACTIVE | Noted: 2020-04-17

## 2020-04-17 PROBLEM — R65.10 SIRS (SYSTEMIC INFLAMMATORY RESPONSE SYNDROME) (HCC): Status: ACTIVE | Noted: 2020-04-17

## 2020-04-17 PROBLEM — R10.9 FLANK PAIN: Status: ACTIVE | Noted: 2020-04-17

## 2020-04-17 PROBLEM — M54.9 MUSCULOSKELETAL BACK PAIN: Status: ACTIVE | Noted: 2020-04-17

## 2020-04-17 PROBLEM — R78.81 BACTEREMIA: Status: ACTIVE | Noted: 2020-04-17

## 2020-04-17 PROBLEM — E86.0 DEHYDRATION: Status: ACTIVE | Noted: 2020-04-17

## 2020-04-17 LAB
ANION GAP SERPL CALC-SCNC: 12 MMOL/L (ref 7–16)
BASOPHILS # BLD AUTO: 0.7 % (ref 0–1.8)
BASOPHILS # BLD: 0.09 K/UL (ref 0–0.12)
BUN SERPL-MCNC: 15 MG/DL (ref 8–22)
CALCIUM SERPL-MCNC: 9 MG/DL (ref 8.5–10.5)
CHLORIDE SERPL-SCNC: 102 MMOL/L (ref 96–112)
CO2 SERPL-SCNC: 24 MMOL/L (ref 20–33)
CREAT SERPL-MCNC: 0.73 MG/DL (ref 0.5–1.4)
EOSINOPHIL # BLD AUTO: 0.12 K/UL (ref 0–0.51)
EOSINOPHIL NFR BLD: 1 % (ref 0–6.9)
ERYTHROCYTE [DISTWIDTH] IN BLOOD BY AUTOMATED COUNT: 39.2 FL (ref 35.9–50)
GLUCOSE SERPL-MCNC: 87 MG/DL (ref 65–99)
HCT VFR BLD AUTO: 42.4 % (ref 37–47)
HGB BLD-MCNC: 14 G/DL (ref 12–16)
IMM GRANULOCYTES # BLD AUTO: 0.08 K/UL (ref 0–0.11)
IMM GRANULOCYTES NFR BLD AUTO: 0.6 % (ref 0–0.9)
LACTATE BLD-SCNC: 1.2 MMOL/L (ref 0.5–2)
LACTATE BLD-SCNC: 1.5 MMOL/L (ref 0.5–2)
LACTATE BLD-SCNC: 2.2 MMOL/L (ref 0.5–2)
LYMPHOCYTES # BLD AUTO: 2.82 K/UL (ref 1–4.8)
LYMPHOCYTES NFR BLD: 22.9 % (ref 22–41)
MCH RBC QN AUTO: 29.6 PG (ref 27–33)
MCHC RBC AUTO-ENTMCNC: 33 G/DL (ref 33.6–35)
MCV RBC AUTO: 89.6 FL (ref 81.4–97.8)
MONOCYTES # BLD AUTO: 1.15 K/UL (ref 0–0.85)
MONOCYTES NFR BLD AUTO: 9.3 % (ref 0–13.4)
NEUTROPHILS # BLD AUTO: 8.07 K/UL (ref 2–7.15)
NEUTROPHILS NFR BLD: 65.5 % (ref 44–72)
NRBC # BLD AUTO: 0 K/UL
NRBC BLD-RTO: 0 /100 WBC
PLATELET # BLD AUTO: 199 K/UL (ref 164–446)
PMV BLD AUTO: 9.8 FL (ref 9–12.9)
POTASSIUM SERPL-SCNC: 3.6 MMOL/L (ref 3.6–5.5)
RBC # BLD AUTO: 4.73 M/UL (ref 4.2–5.4)
SODIUM SERPL-SCNC: 138 MMOL/L (ref 135–145)
WBC # BLD AUTO: 12.3 K/UL (ref 4.8–10.8)

## 2020-04-17 PROCEDURE — 700105 HCHG RX REV CODE 258: Performed by: INTERNAL MEDICINE

## 2020-04-17 PROCEDURE — A9270 NON-COVERED ITEM OR SERVICE: HCPCS | Performed by: FAMILY MEDICINE

## 2020-04-17 PROCEDURE — 36415 COLL VENOUS BLD VENIPUNCTURE: CPT

## 2020-04-17 PROCEDURE — 700102 HCHG RX REV CODE 250 W/ 637 OVERRIDE(OP): Performed by: FAMILY MEDICINE

## 2020-04-17 PROCEDURE — 700102 HCHG RX REV CODE 250 W/ 637 OVERRIDE(OP): Performed by: INTERNAL MEDICINE

## 2020-04-17 PROCEDURE — 770006 HCHG ROOM/CARE - MED/SURG/GYN SEMI*

## 2020-04-17 PROCEDURE — 80048 BASIC METABOLIC PNL TOTAL CA: CPT

## 2020-04-17 PROCEDURE — 99232 SBSQ HOSP IP/OBS MODERATE 35: CPT | Performed by: FAMILY MEDICINE

## 2020-04-17 PROCEDURE — A9270 NON-COVERED ITEM OR SERVICE: HCPCS | Performed by: INTERNAL MEDICINE

## 2020-04-17 PROCEDURE — 700111 HCHG RX REV CODE 636 W/ 250 OVERRIDE (IP): Performed by: INTERNAL MEDICINE

## 2020-04-17 PROCEDURE — 83605 ASSAY OF LACTIC ACID: CPT

## 2020-04-17 PROCEDURE — 85025 COMPLETE CBC W/AUTO DIFF WBC: CPT

## 2020-04-17 RX ORDER — CARISOPRODOL 350 MG/1
350 TABLET ORAL EVERY 6 HOURS
Status: DISCONTINUED | OUTPATIENT
Start: 2020-04-17 | End: 2020-04-18 | Stop reason: HOSPADM

## 2020-04-17 RX ORDER — KETOROLAC TROMETHAMINE 30 MG/ML
30 INJECTION, SOLUTION INTRAMUSCULAR; INTRAVENOUS EVERY 6 HOURS PRN
Status: DISCONTINUED | OUTPATIENT
Start: 2020-04-17 | End: 2020-04-18 | Stop reason: HOSPADM

## 2020-04-17 RX ORDER — IBUPROFEN 600 MG/1
600 TABLET ORAL EVERY 6 HOURS PRN
Status: DISCONTINUED | OUTPATIENT
Start: 2020-04-17 | End: 2020-04-17

## 2020-04-17 RX ORDER — DIPHENHYDRAMINE HCL 25 MG
50 TABLET ORAL EVERY 4 HOURS PRN
Status: DISCONTINUED | OUTPATIENT
Start: 2020-04-17 | End: 2020-04-18 | Stop reason: HOSPADM

## 2020-04-17 RX ADMIN — CARISOPRODOL 350 MG: 350 TABLET ORAL at 17:25

## 2020-04-17 RX ADMIN — ACETAMINOPHEN 650 MG: 325 TABLET, FILM COATED ORAL at 00:18

## 2020-04-17 RX ADMIN — CEFEPIME 2 G: 2 INJECTION, POWDER, FOR SOLUTION INTRAVENOUS at 22:53

## 2020-04-17 RX ADMIN — SODIUM CHLORIDE, POTASSIUM CHLORIDE, SODIUM LACTATE AND CALCIUM CHLORIDE: 600; 310; 30; 20 INJECTION, SOLUTION INTRAVENOUS at 00:08

## 2020-04-17 RX ADMIN — DIPHENHYDRAMINE HYDROCHLORIDE 50 MG: 25 TABLET ORAL at 17:25

## 2020-04-17 RX ADMIN — CEFEPIME 2 G: 2 INJECTION, POWDER, FOR SOLUTION INTRAVENOUS at 13:24

## 2020-04-17 RX ADMIN — SODIUM CHLORIDE, POTASSIUM CHLORIDE, SODIUM LACTATE AND CALCIUM CHLORIDE: 600; 310; 30; 20 INJECTION, SOLUTION INTRAVENOUS at 10:22

## 2020-04-17 RX ADMIN — CARISOPRODOL 350 MG: 350 TABLET ORAL at 11:54

## 2020-04-17 RX ADMIN — ENOXAPARIN SODIUM 40 MG: 100 INJECTION SUBCUTANEOUS at 06:16

## 2020-04-17 RX ADMIN — CEFEPIME 2 G: 2 INJECTION, POWDER, FOR SOLUTION INTRAVENOUS at 05:58

## 2020-04-17 RX ADMIN — ACETAMINOPHEN 650 MG: 325 TABLET, FILM COATED ORAL at 10:22

## 2020-04-17 ASSESSMENT — ENCOUNTER SYMPTOMS
ABDOMINAL PAIN: 0
FLANK PAIN: 0
FEVER: 0
VOMITING: 0
SPUTUM PRODUCTION: 0
DEPRESSION: 0
DOUBLE VISION: 0
ORTHOPNEA: 0
DIARRHEA: 0
CHILLS: 0
HEMOPTYSIS: 0
HEARTBURN: 0
PHOTOPHOBIA: 0
NAUSEA: 0
MYALGIAS: 1
COUGH: 0
HEADACHES: 0
NECK PAIN: 0
BACK PAIN: 1
DIZZINESS: 0
BLURRED VISION: 0
PALPITATIONS: 0
TINGLING: 0

## 2020-04-17 ASSESSMENT — COGNITIVE AND FUNCTIONAL STATUS - GENERAL
SUGGESTED CMS G CODE MODIFIER DAILY ACTIVITY: CH
DAILY ACTIVITIY SCORE: 24
MOBILITY SCORE: 24
SUGGESTED CMS G CODE MODIFIER MOBILITY: CH

## 2020-04-17 ASSESSMENT — LIFESTYLE VARIABLES
EVER HAD A DRINK FIRST THING IN THE MORNING TO STEADY YOUR NERVES TO GET RID OF A HANGOVER: NO
AVERAGE NUMBER OF DAYS PER WEEK YOU HAVE A DRINK CONTAINING ALCOHOL: 0
HAVE PEOPLE ANNOYED YOU BY CRITICIZING YOUR DRINKING: NO
HAVE YOU EVER FELT YOU SHOULD CUT DOWN ON YOUR DRINKING: NO
HOW MANY TIMES IN THE PAST YEAR HAVE YOU HAD 5 OR MORE DRINKS IN A DAY: 0
DOES PATIENT WANT TO STOP DRINKING: NO
EVER_SMOKED: NEVER
TOTAL SCORE: 0
CONSUMPTION TOTAL: NEGATIVE
TOTAL SCORE: 0
ALCOHOL_USE: YES
TOTAL SCORE: 0
SUBSTANCE_ABUSE: 0
ON A TYPICAL DAY WHEN YOU DRINK ALCOHOL HOW MANY DRINKS DO YOU HAVE: 1
EVER FELT BAD OR GUILTY ABOUT YOUR DRINKING: NO

## 2020-04-17 ASSESSMENT — PATIENT HEALTH QUESTIONNAIRE - PHQ9
1. LITTLE INTEREST OR PLEASURE IN DOING THINGS: NOT AT ALL
2. FEELING DOWN, DEPRESSED, IRRITABLE, OR HOPELESS: NOT AT ALL
SUM OF ALL RESPONSES TO PHQ9 QUESTIONS 1 AND 2: 0

## 2020-04-17 ASSESSMENT — FIBROSIS 4 INDEX: FIB4 SCORE: 0.41

## 2020-04-17 NOTE — ASSESSMENT & PLAN NOTE
Patient had positive blood cultures on 4/5/2020 which she grew E. coli pansensitive suspected to be from urinary tract infection as the source.  Patient already completed a course of 10 days of ciprofloxacin.  Repeated cultures ordered.  Continue with IV antibiotics for now.  Follow-up with culture results.

## 2020-04-17 NOTE — ASSESSMENT & PLAN NOTE
SIRS criteria identified on my evaluation include: Tachycardia, with heart rate greater than 90 BPM and Leukocytosis, with WBC greater than 12,000  Patient completed a course of 10 days of ciprofloxacin  Leucocytosis could be stress induced   She has no urinary symptoms.

## 2020-04-17 NOTE — PROGRESS NOTES
Patient c/o some itching on chest and around neck. On assessment patient did have some red (rash appearing) areas. Notified MD- Per MD to order PRN 50mg benadryl.

## 2020-04-17 NOTE — PROGRESS NOTES
· 2 RN skin check complete. With Shelbi RN  · Skin intact and no devices in use.  · Devices in place NA.  · Skin assessed under devices NA.  · Confirmed pressure ulcers found on NA.  · New potential pressure ulcers noted on NA. Wound consult placed? NA. Photo uploaded? NA.   · The following interventions are in place pt instructed to change position frequently.

## 2020-04-17 NOTE — CARE PLAN
Problem: Infection  Goal: Will remain free from infection  Outcome: PROGRESSING AS EXPECTED  Note: IV abx as ordered     Problem: Venous Thromboembolism (VTW)/Deep Vein Thrombosis (DVT) Prevention:  Goal: Patient will participate in Venous Thrombosis (VTE)/Deep Vein Thrombosis (DVT)Prevention Measures  Outcome: PROGRESSING AS EXPECTED  Flowsheets (Taken 4/17/2020 0037)  Pharmacologic Prophylaxis Used: LMWH: Enoxaparin(Lovenox)     Problem: Knowledge Deficit  Goal: Knowledge of disease process/condition, treatment plan, diagnostic tests, and medications will improve  Outcome: PROGRESSING AS EXPECTED     Problem: Discharge Barriers/Planning  Goal: Patient's continuum of care needs will be met  Outcome: PROGRESSING AS EXPECTED     Problem: Fluid Volume:  Goal: Will maintain balanced intake and output  Outcome: PROGRESSING AS EXPECTED  Note: IV fluids as ordered     Problem: Pain Management  Goal: Pain level will decrease to patient's comfort goal  Outcome: PROGRESSING AS EXPECTED  Note: PRN tylenol as ordered

## 2020-04-17 NOTE — PROGRESS NOTES
Assumed care of pt at 0715. Report received and bedside rounding completed with night RN. Pt is calm no SOB, or in any acute distress noted.    Pt is not considered a fall risk. edu provided on risk level. Fall precautions in place,  bed alarm. - Treaded non slip socks. Bed locked. Communication board updated with POC. Call light and pt belongings within reach - pt makes needs known - hourly rounding in place. See flowsheets for further assessment.     Patient c/o minimal pain this am- controlled well by tylenol.

## 2020-04-17 NOTE — ED PROVIDER NOTES
ED Provider Note    Scribed for Henrry Iverson M.D. by Nathaly Torres. 4/16/2020  6:34 PM    Primary care provider: AKIKO Guillermo  Means of arrival: Walk in  History obtained from: Patient  History limited by: Non    CHIEF COMPLAINT  Chief Complaint   Patient presents with   • Flank Pain     right since yesterday. Treated 2 weeks ago for pyelonephritis and positive blood cultures.    • Palpitations     tachycardic in triage and EKG completed.        HPI  Nathaly Luo is a 24 y.o. female who presents to the Emergency Department for evaluation of right flank pain that began yestersay. Patient reports she was diagnosed on 4/4 for pyelonephritis and positive blood cultures of E.coli. She was prescribed Cipro which she will finish on Monday. She reports her pain was alleviated but returned prompting her return to the ED. She did not recive a CT scan during her previous visit. She denies any chance of pregnancy. Patient has additional complaints of palpitations that began in triage and diarrhea she associated to her antibiotics. She denies any cough, fever, or cold symptoms.     REVIEW OF SYSTEMS  Pertinent positives include right flank pain, palpitations, diarrhea. Pertinent negatives include no cough, fever, or cold symptoms.  All other systems reviewed and negative.    PAST MEDICAL HISTORY    pyelonephritis and positive blood cultures    SURGICAL HISTORY  patient denies any surgical history    SOCIAL HISTORY  Social History     Tobacco Use   • Smoking status: Never Smoker   • Smokeless tobacco: Never Used   Substance Use Topics   • Alcohol use: No   • Drug use: No      Social History     Substance and Sexual Activity   Drug Use No       FAMILY HISTORY  Family History   Problem Relation Age of Onset   • Diabetes Mother    • Autoimmune Disease Mother    • Cancer Maternal Aunt        CURRENT MEDICATIONS  Home Medications     Reviewed by Raymond Iverson R.N. (Registered Nurse) on 04/16/20 at  1758  Med List Status: <None>   Medication Last Dose Status   acetaminophen (TYLENOL) 325 MG Tab  Active   albuterol 108 (90 Base) MCG/ACT Aero Soln inhalation aerosol  Active   ciprofloxacin (CIPRO) 500 MG Tab  Active                ALLERGIES  Allergies   Allergen Reactions   • Bolanos Swelling     Swollen lip when she eats any kind of bolanos   • Other Environmental Rash     Some perfumes and hand        PHYSICAL EXAM  VITAL SIGNS: /86   Pulse (!) 113   Temp 37.1 °C (98.7 °F) (Temporal)   Resp 18   Ht 1.524 m (5')   Wt 81 kg (178 lb 9.2 oz)   SpO2 98%   BMI 34.88 kg/m²     Vital signs reviewed.  Constitutional:  Female sitting in bed. Appears well-developed and well-nourished. No distress.   Head: Normocephalic.   Mouth/Throat: Oropharynx is clear and moist.   Eyes: EOM are normal. Pupils are equal, round, and reactive to light.   Neck: Normal range of motion. Neck supple.   Cardiovascular: Normal rate, regular rhythm and normal heart sounds.    Pulmonary/Chest: Effort normal and breath sounds normal. No wheezes.   Abdominal: Soft. There is no tenderness. There is no rebound and no guarding.   Musculoskeletal: Bilateral CVA tenderness  Lymphadenopathy: No cervical adenopathy.   Neurological: Patient is alert and oriented to person, place, and time. CNs II - XII intact. DTRs intact. Normal sensation and strength.  Skin: Skin is warm and dry.   Psychiatric: Patient has a normal mood and affect. Behavior is normal.     LABS  Results for orders placed or performed during the hospital encounter of 04/16/20   LACTIC ACID   Result Value Ref Range    Lactic Acid 2.5 (H) 0.5 - 2.0 mmol/L   CBC WITH DIFFERENTIAL   Result Value Ref Range    WBC 13.1 (H) 4.8 - 10.8 K/uL    RBC 5.31 4.20 - 5.40 M/uL    Hemoglobin 15.9 12.0 - 16.0 g/dL    Hematocrit 46.7 37.0 - 47.0 %    MCV 87.9 81.4 - 97.8 fL    MCH 29.9 27.0 - 33.0 pg    MCHC 34.0 33.6 - 35.0 g/dL    RDW 38.6 35.9 - 50.0 fL    Platelet Count 376 164 - 446  K/uL    MPV 8.8 (L) 9.0 - 12.9 fL    Neutrophils-Polys 68.60 44.00 - 72.00 %    Lymphocytes 24.20 22.00 - 41.00 %    Monocytes 4.80 0.00 - 13.40 %    Eosinophils 1.00 0.00 - 6.90 %    Basophils 0.80 0.00 - 1.80 %    Immature Granulocytes 0.60 0.00 - 0.90 %    Nucleated RBC 0.00 /100 WBC    Neutrophils (Absolute) 8.94 (H) 2.00 - 7.15 K/uL    Lymphs (Absolute) 3.16 1.00 - 4.80 K/uL    Monos (Absolute) 0.63 0.00 - 0.85 K/uL    Eos (Absolute) 0.13 0.00 - 0.51 K/uL    Baso (Absolute) 0.11 0.00 - 0.12 K/uL    Immature Granulocytes (abs) 0.08 0.00 - 0.11 K/uL    NRBC (Absolute) 0.00 K/uL   COMP METABOLIC PANEL   Result Value Ref Range    Sodium 139 135 - 145 mmol/L    Potassium 4.1 3.6 - 5.5 mmol/L    Chloride 99 96 - 112 mmol/L    Co2 22 20 - 33 mmol/L    Anion Gap 18.0 (H) 7.0 - 16.0    Glucose 80 65 - 99 mg/dL    Bun 16 8 - 22 mg/dL    Creatinine 0.66 0.50 - 1.40 mg/dL    Calcium 10.1 8.5 - 10.5 mg/dL    AST(SGOT) 23 12 - 45 U/L    ALT(SGPT) 12 2 - 50 U/L    Alkaline Phosphatase 56 30 - 99 U/L    Total Bilirubin 0.3 0.1 - 1.5 mg/dL    Albumin 4.8 3.2 - 4.9 g/dL    Total Protein 8.7 (H) 6.0 - 8.2 g/dL    Globulin 3.9 (H) 1.9 - 3.5 g/dL    A-G Ratio 1.2 g/dL   URINALYSIS   Result Value Ref Range    Color Yellow     Character Clear     Specific Gravity 1.005 <1.035    Ph 6.0 5.0 - 8.0    Glucose Negative Negative mg/dL    Ketones Negative Negative mg/dL    Protein Negative Negative mg/dL    Bilirubin Negative Negative    Urobilinogen, Urine 0.2 Negative    Nitrite Negative Negative    Leukocyte Esterase Negative Negative    Occult Blood Negative Negative    Micro Urine Req see below    HCG QUAL SERUM   Result Value Ref Range    Beta-Hcg Qualitative Serum Negative Negative   ESTIMATED GFR   Result Value Ref Range    GFR If African American >60 >60 mL/min/1.73 m 2    GFR If Non African American >60 >60 mL/min/1.73 m 2   EKG (NOW)   Result Value Ref Range    Report       [facility] Emergency Dept.    Test  Date:  2020  Pt Name:    JOSE LUIS VERAS             Department: ER  MRN:        [id number]                      Room:  Gender:     Female                       Technician: [id number]  :        [id number]                   Requested By:ER TRIAGE PROTOCOL  Order #:    [id number]                    Reading MD:    Measurements  Intervals                                Axis  Rate:       107                          P:          55  SD:         139                          QRS:        32  QRSD:       80                           T:          50  QT:         327  QTc:        437    Interpretive Statements  Sinus tachycardia  Probable left atrial enlargement  No previous ECG available for comparison       All labs reviewed by me.    RADIOLOGY  CT-ABDOMEN-PELVIS WITH   Final Result      Negative contrast-enhanced CT of the abdomen and pelvis.      DX-CHEST-PORTABLE (1 VIEW)   Final Result      No evidence of acute cardiopulmonary process.        The radiologist's interpretation of all radiological studies have been reviewed by me.    COURSE & MEDICAL DECISION MAKING  Pertinent Labs & Imaging studies reviewed. (See chart for details) The patient's [facility] and past medical records were reviewed    6:34 PM - Patient seen and examined at bedside. Ordered CT abdomen, DX chest, Lactic acid, CBC w/ differential, CMP, Urinalysis, Urine culture, blood culture, EKG to evaluate her symptoms. I informed the patient I would like to run lab work and perform a CT to look for a kidney stone and evaluate her continued symptoms. She verbalizes agreement to the plan for care. The differential diagnoses include but are not limited to: recurrent pyelonephritis, kidney stone, abscess    CT scan was negative for kidney stone or abscess.  Patient was reassured however given the fact that she has been on antibiotics for 12 days and she has a white count of 13,000 and a lactic acidosis I am concerned that she could be bacteremic still.   Patient will be admitted by the hospitalist.  Dr. Hart was consulted at 8:30 PM.    HYDRATION: Based on the patient's presentation of Acute Diarrhea and Tachycardia the patient was given IV fluids. IV Hydration was used because oral hydration was not adequate alone. Upon recheck following hydration, the patient was improved.    7:27 PM Patient was reevaluated at bedside. Discussed lab and radiology results with the patient and informed them that I would like to admit her for further evaluation and treatment. He verbalizes understanding to the plan for admit.      I discussed the patient's case and the above findings with Dr. Hart (Hospitalist) who agreed to evaluate the patient for admit.     DISPOSITION:  Patient will be hospitalized by Dr. Hart (Hospitalist) in guarded condition.    FINAL IMPRESSION  Lactic acidosis  Pyelonephritis  Bacteremia     Nathaly WELLER (Scribe), am scribing for, and in the presence of, Henrry Iverson M.D..    Electronically signed by: Nathaly Torres (Scribe), 4/16/2020    Henrry WELLER M.D. personally performed the services described in this documentation, as scribed by Nathaly Torres in my presence, and it is both accurate and complete.    C  The note accurately reflects work and decisions made by me.  Henrry Iverson M.D.  4/16/2020  8:27 PM

## 2020-04-17 NOTE — PROGRESS NOTES
Patient admitted from ED. Patient alert and oriented. Up ad les.  IV abx and fluids as ordered.  PRN tylenol for pain.  No new complaints.  WCTM.

## 2020-04-17 NOTE — H&P
NoneHospital Medicine History & Physical Note    Date of Service  4/16/2020    Primary Care Physician  MILADIS Guillermo.    Consultants  None    Code Status  Full    Chief Complaint  Flank pain    History of Presenting Illness  24 y.o. female who presented 4/16/2020 with chills and bilateral flank pain.  Patient was recently here, diagnosed with pyelonephritis and bacteremia which was caused by E. coli.  Initially, it was not known that it was E. coli and patient was sent home on Omnicef, this was transitioned to ciprofloxacin.  Patient still had a couple of days left of the Cipro but had been on it for 10 days.  She states she has been compliant.  She states she was doing slightly better until yesterday when she had worsening chills as well as bilateral flank pain, right greater than left.  She states these were similar symptoms she had before but they were actually worse now.  She describes her flank pain as an ache, 6/10 at its worse.  She denied any nausea or vomiting.  Patient was noted to be septic again.  I did discuss the case including labs and imaging with the ER physician.    Review of Systems  Review of Systems   Constitutional: Positive for chills and malaise/fatigue. Negative for fever.   HENT: Negative for congestion.    Respiratory: Negative for cough, sputum production, shortness of breath and stridor.    Cardiovascular: Negative for chest pain, palpitations and leg swelling.   Gastrointestinal: Negative for abdominal pain, constipation, diarrhea, nausea and vomiting.   Genitourinary: Positive for flank pain. Negative for dysuria and urgency.   Musculoskeletal: Negative for falls and myalgias.   Neurological: Negative for dizziness, tingling, loss of consciousness, weakness and headaches.   Psychiatric/Behavioral: Negative for depression and suicidal ideas.   All other systems reviewed and are negative.      Past Medical History  None    Surgical History  None    Family History  family  history includes Autoimmune Disease in her mother; Cancer in her maternal aunt; Diabetes in her mother.     Social History   reports that she has never smoked. She has never used smokeless tobacco. She reports that she does not drink alcohol or use drugs.    Allergies  Allergies   Allergen Reactions   • Bolanos Swelling     Swollen lip when she eats any kind of bolanos   • Other Environmental Rash     Some perfumes and hand        Medications  Prior to Admission Medications   Prescriptions Last Dose Informant Patient Reported? Taking?   acetaminophen (TYLENOL) 500 MG Tab 4/15/2020 at 1200 Patient Yes Yes   Sig: Take 1,000 mg by mouth every 6 hours as needed.   ciprofloxacin (CIPRO) 500 MG Tab 4/16/2020 at AM Patient No No   Sig: Take 1 Tab by mouth 2 times a day for 14 days.      Facility-Administered Medications: None       Physical Exam  Temp:  [37.1 °C (98.7 °F)] 37.1 °C (98.7 °F)  Pulse:  [113] 113  Resp:  [18] 18  BP: (132)/(86) 132/86  SpO2:  [98 %] 98 %    Physical Exam  Vitals signs and nursing note reviewed.   Constitutional:       General: She is not in acute distress.     Appearance: She is well-developed. She is not diaphoretic.   HENT:      Head: Normocephalic and atraumatic.      Right Ear: External ear normal.      Left Ear: External ear normal.      Nose: Nose normal. No congestion or rhinorrhea.      Mouth/Throat:      Mouth: Mucous membranes are dry.      Pharynx: No oropharyngeal exudate.   Eyes:      General:         Right eye: No discharge.         Left eye: No discharge.      Extraocular Movements: Extraocular movements intact.   Neck:      Musculoskeletal: Normal range of motion and neck supple.      Trachea: No tracheal deviation.   Cardiovascular:      Rate and Rhythm: Normal rate and regular rhythm.      Heart sounds: No murmur. No friction rub. No gallop.    Pulmonary:      Effort: Pulmonary effort is normal. No respiratory distress.      Breath sounds: Normal breath sounds. No  stridor. No wheezing or rales.   Chest:      Chest wall: No tenderness.   Abdominal:      General: Bowel sounds are normal. There is no distension.      Palpations: Abdomen is soft.      Tenderness: There is no abdominal tenderness. There is right CVA tenderness and left CVA tenderness. There is no guarding or rebound.   Musculoskeletal: Normal range of motion.         General: No tenderness.      Right lower leg: No edema.      Left lower leg: No edema.   Lymphadenopathy:      Cervical: No cervical adenopathy.   Skin:     General: Skin is warm and dry.      Coloration: Skin is not jaundiced.      Findings: No erythema or rash.   Neurological:      General: No focal deficit present.      Mental Status: She is alert and oriented to person, place, and time.      Cranial Nerves: No cranial nerve deficit.   Psychiatric:         Mood and Affect: Mood normal.         Behavior: Behavior normal.         Thought Content: Thought content normal.         Judgment: Judgment normal.         Laboratory:  Recent Labs     04/16/20  1845   WBC 13.1*   RBC 5.31   HEMOGLOBIN 15.9   HEMATOCRIT 46.7   MCV 87.9   MCH 29.9   MCHC 34.0   RDW 38.6   PLATELETCT 376   MPV 8.8*     Recent Labs     04/16/20  1845   SODIUM 139   POTASSIUM 4.1   CHLORIDE 99   CO2 22   GLUCOSE 80   BUN 16   CREATININE 0.66   CALCIUM 10.1     Recent Labs     04/16/20  1845   ALTSGPT 12   ASTSGOT 23   ALKPHOSPHAT 56   TBILIRUBIN 0.3   GLUCOSE 80         No results for input(s): NTPROBNP in the last 72 hours.      No results for input(s): TROPONINT in the last 72 hours.    Urinalysis:    Recent Labs     04/16/20  1845   SPECGRAVITY 1.005   GLUCOSEUR Negative   KETONES Negative   NITRITE Negative   LEUKESTERAS Negative        Imaging:  CT-ABDOMEN-PELVIS WITH   Final Result      Negative contrast-enhanced CT of the abdomen and pelvis.      DX-CHEST-PORTABLE (1 VIEW)   Final Result      No evidence of acute cardiopulmonary process.            Assessment/Plan:  I  anticipate this patient will require at least two midnights for appropriate medical management, necessitating inpatient admission.    Sepsis (HCC)- (present on admission)  Assessment & Plan  -This is Sepsis Present on admission  SIRS criteria identified on my evaluation include: Tachycardia, with heart rate greater than 90 BPM and Leukocytosis, with WBC greater than 12,000  Source is presumed bacteremia  Sepsis protocol initiated  Fluid resuscitation ordered per protocol  IV antibiotics as appropriate for source of sepsis  While organ dysfunction may be noted elsewhere in this problem list or in the chart, degree of organ dysfunction does not meet CMS criteria for severe sepsis  -Start IV cefepime  -Await culture results  -I did personally review her chest x-ray, noted no acute cardiopulmonary process, no respiratory symptoms    Bacteremia- (present on admission)  Assessment & Plan  -Was recently diagnosed with E. coli bacteremia, from a UTI  -Urine is currently clear, certainly concerning that she has bacteremia again, will broaden her coverage with cefepime  -Do not feel she needs MRSA coverage at this time    Flank pain- (present on admission)  Assessment & Plan  -Bilateral, right greater than left  -She did have pyelonephritis prior but at this point in time her urine looks benign  -CT scan was obtained, no stones noted  -Symptomatic care    Dehydration- (present on admission)  Assessment & Plan  -Start IV fluids  -Repeat BMP in the morning  -She does have a gap but is not acidotic      VTE prophylaxis: Lovenox

## 2020-04-17 NOTE — ED NOTES
Pt c/o some coolness to IV sit-- IV checked patent flushes easily does not appear to be infiltrated at this time. Will continue to monitor

## 2020-04-17 NOTE — ED TRIAGE NOTES
Chief Complaint   Patient presents with   • Flank Pain     right since yesterday. Treated 2 weeks ago for pyelonephritis and positive blood cultures.    • Palpitations     tachycardic in triage and EKG completed.      Also complains of continued dysuria. Denies hematuria or fever. Explained triage process, to waiting room. Asked to inform RN if questions or concerns arise.

## 2020-04-17 NOTE — PROGRESS NOTES
[facility] Medicine Daily Progress Note    Date of Service  4/17/2020    Chief Complaint  24 y.o. female admitted 4/16/2020 with bilateral lower back pain.    Hospital Course  This is a 24 years old female with no significant past medical history presented with bilateral lower back pain worse on left.  This patient presented to the ER on 4/5/2020 with a fever and febrile seizure.  Was found to have UTI and was discharged home on Omnicef.  Next day her blood culture came back positive so she had a call to come back to the emergency room.  She was given a prescription of ciprofloxacin for 10 days.  Her blood culture grew E. coli pansensitive.  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  On exam she had significant musculoskeletal tenderness on her lower back area bilaterally but she had negative CVA on exam.  UA on this admission came back negative.  WBCs were mildly elevated.  Her chest x-ray was negative for any acute cardiopulmonary process.  She had a CT scan of the abdomen pelvis with contrast which was negative for any acute findings.  Cultures has been obtained and was started on broad-spectrum IV antibiotics for presumptive and treated bacteremia.  Patient denied any dysuria, hematuria, urgency or frequency.  Denies any fever chills.  Interval Problem Update  Resting in bed comfortably.  Hemodynamically stable.  Afebrile overnight and this morning.  Denies any dysuria or urgency.  Tolerating p.o. fairly.  No acute distress noted.    Consultants/Specialty  None    Code Status  Full code    Disposition  Likely home once medically cleared.    Review of Systems  Review of Systems   Constitutional: Negative for chills and fever.   HENT: Negative for ear pain, hearing loss and tinnitus.    Eyes: Negative for blurred vision, double vision and photophobia.   Respiratory: Negative for cough, hemoptysis and sputum production.     Cardiovascular: Negative for chest pain, palpitations and orthopnea.   Gastrointestinal: Negative for abdominal pain, diarrhea, heartburn, nausea and vomiting.   Genitourinary: Negative for dysuria, flank pain, frequency, hematuria and urgency.   Musculoskeletal: Positive for back pain and myalgias. Negative for neck pain.   Skin: Negative for rash.   Neurological: Negative for dizziness, tingling and headaches.   Psychiatric/Behavioral: Negative for depression, substance abuse and suicidal ideas.        Physical Exam  Temp:  [36.5 °C (97.7 °F)-37.3 °C (99.1 °F)] 36.5 °C (97.7 °F)  Pulse:  [] 84  Resp:  [16-18] 17  BP: (109-132)/(58-92) 109/58  SpO2:  [97 %-99 %] 97 %    Physical Exam  Constitutional:       General: She is not in acute distress.     Appearance: Normal appearance. She is not ill-appearing.   HENT:      Head: Normocephalic and atraumatic.      Nose: No congestion.      Mouth/Throat:      Mouth: Mucous membranes are moist.   Eyes:      Extraocular Movements: Extraocular movements intact.      Pupils: Pupils are equal, round, and reactive to light.   Neck:      Musculoskeletal: No neck rigidity or muscular tenderness.   Cardiovascular:      Rate and Rhythm: Normal rate and regular rhythm.      Heart sounds: No friction rub. No gallop.    Pulmonary:      Effort: Pulmonary effort is normal. No respiratory distress.      Breath sounds: No wheezing.   Abdominal:      General: Abdomen is flat. Bowel sounds are normal. There is no distension.      Tenderness: There is no abdominal tenderness. There is no right CVA tenderness or left CVA tenderness.   Musculoskeletal:         General: Tenderness present.      Lumbar back: She exhibits tenderness and bony tenderness.      Right lower leg: No edema.      Left lower leg: No edema.   Skin:     Coloration: Skin is not jaundiced or pale.   Neurological:      General: No focal deficit present.      Mental Status: She is alert and oriented to person, place,  and time.   Psychiatric:         Mood and Affect: Mood normal.         Behavior: Behavior normal.         Fluids    Intake/Output Summary (Last 24 hours) at 4/17/2020 1131  Last data filed at 4/17/2020 0600  Gross per 24 hour   Intake 586.67 ml   Output --   Net 586.67 ml       Laboratory  Recent Labs     04/16/20 1845 04/17/20  0211   WBC 13.1* 12.3*   RBC 5.31 4.73   HEMOGLOBIN 15.9 14.0   HEMATOCRIT 46.7 42.4   MCV 87.9 89.6   MCH 29.9 29.6   MCHC 34.0 33.0*   RDW 38.6 39.2   PLATELETCT 376 199   MPV 8.8* 9.8     Recent Labs     04/16/20 1845 04/17/20  0211   SODIUM 139 138   POTASSIUM 4.1 3.6   CHLORIDE 99 102   CO2 22 24   GLUCOSE 80 87   BUN 16 15   CREATININE 0.66 0.73   CALCIUM 10.1 9.0     Recent Labs     04/16/20 1845   INR 0.95               Imaging  CT-ABDOMEN-PELVIS WITH   Final Result      Negative contrast-enhanced CT of the abdomen and pelvis.      DX-CHEST-PORTABLE (1 VIEW)   Final Result      No evidence of acute cardiopulmonary process.           Assessment/Plan  SIRS (systemic inflammatory response syndrome) (HCC)- (present on admission)  Assessment & Plan  SIRS criteria identified on my evaluation include: Tachycardia, with heart rate greater than 90 BPM and Leukocytosis, with WBC greater than 12,000  Patient completed a course of 10 days of ciprofloxacin  Leucocytosis could be stress induced   She has no urinary symptoms.       Bacteremia- (present on admission)  Assessment & Plan  Patient had positive blood cultures on 4/5/2020 which she grew E. coli pansensitive suspected to be from urinary tract infection as the source.  Patient already completed a course of 10 days of ciprofloxacin.  Repeated cultures ordered.  Continue with IV antibiotics for now.  Follow-up with culture results.    Musculoskeletal back pain- (present on admission)  Assessment & Plan  On exam patient has musculoskeletal tenderness on the lumbar and paralumbar area with no CVA tenderness.  Suspect this as patient was on  ciprofloxacin and was doing heavy physical exertion (patient works in dietary department and pushes heavy containers).  Started Soma and NSAIDs.    Dehydration- (present on admission)  Assessment & Plan  -Start IV fluids  -Repeat BMP in the morning  -She does have a gap but is not acidotic       VTE prophylaxis: SCDs.

## 2020-04-17 NOTE — ASSESSMENT & PLAN NOTE
On exam patient has musculoskeletal tenderness on the lumbar and paralumbar area with no CVA tenderness.  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  Started Soma and NSAIDs.

## 2020-04-17 NOTE — ED NOTES
Med rec complete per phone interview with patient. Allergies reviewed. Pt currently on CIPROFLOXACIN.

## 2020-04-18 VITALS
SYSTOLIC BLOOD PRESSURE: 113 MMHG | OXYGEN SATURATION: 97 % | DIASTOLIC BLOOD PRESSURE: 65 MMHG | HEART RATE: 90 BPM | HEIGHT: 60 IN | TEMPERATURE: 98.4 F | WEIGHT: 178.79 LBS | BODY MASS INDEX: 35.1 KG/M2 | RESPIRATION RATE: 19 BRPM

## 2020-04-18 PROBLEM — R65.10 SIRS (SYSTEMIC INFLAMMATORY RESPONSE SYNDROME) (HCC): Status: RESOLVED | Noted: 2020-04-17 | Resolved: 2020-04-18

## 2020-04-18 PROBLEM — E86.0 DEHYDRATION: Status: RESOLVED | Noted: 2020-04-17 | Resolved: 2020-04-18

## 2020-04-18 LAB
ALBUMIN SERPL BCP-MCNC: 3.8 G/DL (ref 3.2–4.9)
ALBUMIN/GLOB SERPL: 1.2 G/DL
ALP SERPL-CCNC: 50 U/L (ref 30–99)
ALT SERPL-CCNC: 8 U/L (ref 2–50)
ANION GAP SERPL CALC-SCNC: 10 MMOL/L (ref 7–16)
AST SERPL-CCNC: 12 U/L (ref 12–45)
BACTERIA UR CULT: NORMAL
BASOPHILS # BLD AUTO: 0.8 % (ref 0–1.8)
BASOPHILS # BLD: 0.07 K/UL (ref 0–0.12)
BILIRUB SERPL-MCNC: 0.2 MG/DL (ref 0.1–1.5)
BUN SERPL-MCNC: 14 MG/DL (ref 8–22)
CALCIUM SERPL-MCNC: 9 MG/DL (ref 8.5–10.5)
CHLORIDE SERPL-SCNC: 104 MMOL/L (ref 96–112)
CO2 SERPL-SCNC: 22 MMOL/L (ref 20–33)
CREAT SERPL-MCNC: 0.66 MG/DL (ref 0.5–1.4)
EOSINOPHIL # BLD AUTO: 0.23 K/UL (ref 0–0.51)
EOSINOPHIL NFR BLD: 2.5 % (ref 0–6.9)
ERYTHROCYTE [DISTWIDTH] IN BLOOD BY AUTOMATED COUNT: 38.7 FL (ref 35.9–50)
GLOBULIN SER CALC-MCNC: 3.2 G/DL (ref 1.9–3.5)
GLUCOSE SERPL-MCNC: 95 MG/DL (ref 65–99)
HCT VFR BLD AUTO: 41.2 % (ref 37–47)
HGB BLD-MCNC: 14.1 G/DL (ref 12–16)
IMM GRANULOCYTES # BLD AUTO: 0.06 K/UL (ref 0–0.11)
IMM GRANULOCYTES NFR BLD AUTO: 0.7 % (ref 0–0.9)
LYMPHOCYTES # BLD AUTO: 1.59 K/UL (ref 1–4.8)
LYMPHOCYTES NFR BLD: 17.4 % (ref 22–41)
MCH RBC QN AUTO: 30.3 PG (ref 27–33)
MCHC RBC AUTO-ENTMCNC: 34.2 G/DL (ref 33.6–35)
MCV RBC AUTO: 88.6 FL (ref 81.4–97.8)
MONOCYTES # BLD AUTO: 0.77 K/UL (ref 0–0.85)
MONOCYTES NFR BLD AUTO: 8.4 % (ref 0–13.4)
NEUTROPHILS # BLD AUTO: 6.43 K/UL (ref 2–7.15)
NEUTROPHILS NFR BLD: 70.2 % (ref 44–72)
NRBC # BLD AUTO: 0 K/UL
NRBC BLD-RTO: 0 /100 WBC
PLATELET # BLD AUTO: 288 K/UL (ref 164–446)
PMV BLD AUTO: 8.8 FL (ref 9–12.9)
POTASSIUM SERPL-SCNC: 3.9 MMOL/L (ref 3.6–5.5)
PROT SERPL-MCNC: 7 G/DL (ref 6–8.2)
RBC # BLD AUTO: 4.65 M/UL (ref 4.2–5.4)
SIGNIFICANT IND 70042: NORMAL
SITE SITE: NORMAL
SODIUM SERPL-SCNC: 136 MMOL/L (ref 135–145)
SOURCE SOURCE: NORMAL
WBC # BLD AUTO: 9.2 K/UL (ref 4.8–10.8)

## 2020-04-18 PROCEDURE — 700102 HCHG RX REV CODE 250 W/ 637 OVERRIDE(OP): Performed by: FAMILY MEDICINE

## 2020-04-18 PROCEDURE — 99239 HOSP IP/OBS DSCHRG MGMT >30: CPT | Performed by: FAMILY MEDICINE

## 2020-04-18 PROCEDURE — 85025 COMPLETE CBC W/AUTO DIFF WBC: CPT

## 2020-04-18 PROCEDURE — 80053 COMPREHEN METABOLIC PANEL: CPT

## 2020-04-18 PROCEDURE — 700105 HCHG RX REV CODE 258: Performed by: INTERNAL MEDICINE

## 2020-04-18 PROCEDURE — A9270 NON-COVERED ITEM OR SERVICE: HCPCS | Performed by: FAMILY MEDICINE

## 2020-04-18 PROCEDURE — 36415 COLL VENOUS BLD VENIPUNCTURE: CPT

## 2020-04-18 PROCEDURE — 700111 HCHG RX REV CODE 636 W/ 250 OVERRIDE (IP): Performed by: INTERNAL MEDICINE

## 2020-04-18 PROCEDURE — 700111 HCHG RX REV CODE 636 W/ 250 OVERRIDE (IP): Performed by: FAMILY MEDICINE

## 2020-04-18 RX ORDER — METHYLPREDNISOLONE SODIUM SUCCINATE 125 MG/2ML
60 INJECTION, POWDER, LYOPHILIZED, FOR SOLUTION INTRAMUSCULAR; INTRAVENOUS ONCE
Status: COMPLETED | OUTPATIENT
Start: 2020-04-18 | End: 2020-04-18

## 2020-04-18 RX ORDER — IBUPROFEN 400 MG/1
400 TABLET ORAL EVERY 6 HOURS PRN
Qty: 30 TAB | Refills: 0 | Status: SHIPPED | OUTPATIENT
Start: 2020-04-18 | End: 2020-08-17

## 2020-04-18 RX ORDER — CIPROFLOXACIN 500 MG/1
500 TABLET, FILM COATED ORAL 2 TIMES DAILY
Qty: 8 TAB | Refills: 0 | Status: SHIPPED | OUTPATIENT
Start: 2020-04-18 | End: 2020-04-22

## 2020-04-18 RX ORDER — OMEPRAZOLE 20 MG/1
20 CAPSULE, DELAYED RELEASE ORAL DAILY
Qty: 30 CAP | Refills: 0 | Status: SHIPPED | OUTPATIENT
Start: 2020-04-18 | End: 2021-07-24

## 2020-04-18 RX ADMIN — METHYLPREDNISOLONE SODIUM SUCCINATE 60 MG: 125 INJECTION, POWDER, FOR SOLUTION INTRAMUSCULAR; INTRAVENOUS at 11:09

## 2020-04-18 RX ADMIN — CEFEPIME 2 G: 2 INJECTION, POWDER, FOR SOLUTION INTRAVENOUS at 06:12

## 2020-04-18 RX ADMIN — DIPHENHYDRAMINE HYDROCHLORIDE 50 MG: 25 TABLET ORAL at 11:09

## 2020-04-18 NOTE — DISCHARGE SUMMARY
Discharge Summary    CHIEF COMPLAINT ON ADMISSION  Chief Complaint   Patient presents with   • Flank Pain     right since yesterday. Treated 2 weeks ago for pyelonephritis and positive blood cultures.    • Palpitations     tachycardic in triage and EKG completed.        Reason for Admission  Palpitations     Admission Date  4/16/2020    CODE STATUS  Prior    HPI & HOSPITAL COURSE  This is a 24 years old female with no significant past medical history presented with bilateral lower back pain worse on left.  This patient presented to the ER on 4/5/2020 with a fever and febrile seizure.  Was found to have UTI and was discharged home on Omnicef.  Next day her blood culture came back positive so she had a call to come back to the emergency room.  She was given a prescription of ciprofloxacin for 10 days.  Her blood culture grew E. coli pansensitive.  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  On exam she had significant musculoskeletal tenderness on her lower back area bilaterally but she had negative CVA on exam.  UA on this admission came back negative.  WBCs were mildly elevated.  Her chest x-ray was negative for any acute cardiopulmonary process.  She had a CT scan of the abdomen pelvis with contrast which was negative for any acute findings.  Cultures has been obtained and was started on broad-spectrum IV antibiotics for presumptive and treated bacteremia.  Patient denied any dysuria, hematuria, urgency or frequency.  Denies any fever chills.  Her leukocytosis resolved.  Her cultures were negative during this hospital stay.  She developed allergic reaction to Soma which was discontinued.  She was given Benadryl and steroids for mild rash she developed.  Was hydrated with IV fluids during this hospital stay.  He was hemodynamically clinically stable.  Patient to complete a course of antibiotics at home for her bacteremia.   Counseled on heavy physical labor while she is on ciprofloxacin.  She was cleared for discharge from medical standpoint.       Therefore, she is discharged in fair and stable condition to home with close outpatient follow-up.    The patient met 2-midnight criteria for an inpatient stay at the time of discharge.    Discharge Date  4/18/2020    FOLLOW UP ITEMS POST DISCHARGE  Follow-up with PCP in 1 week.    DISCHARGE DIAGNOSES  Active Problems:    Musculoskeletal back pain POA: Yes    Bacteremia POA: Yes  Resolved Problems:    SIRS (systemic inflammatory response syndrome) (HCC) POA: Yes    Dehydration POA: Yes      FOLLOW UP  No future appointments.  Rukhsana Álvarez, YOUSIF.P.R.N.  [id number]  [facility] [address]  [facility] [id number] [phone]    Schedule an appointment as soon as possible for a visit in 1 week        MEDICATIONS ON DISCHARGE     Medication List      START taking these medications      Instructions   ibuprofen 400 MG Tabs  Commonly known as:  MOTRIN   Take 1 Tab by mouth every 6 hours as needed.  Dose:  400 mg     omeprazole 20 MG delayed-release capsule  Commonly known as:  PRILOSEC   Take 1 Cap by mouth every day.  Dose:  20 mg        CONTINUE taking these medications      Instructions   acetaminophen 500 MG Tabs  Commonly known as:  TYLENOL   Take 1,000 mg by mouth every 6 hours as needed.  Dose:  1,000 mg     ciprofloxacin 500 MG Tabs  Commonly known as:  CIPRO   Take 1 Tab by mouth 2 times a day for 4 days.  Dose:  500 mg            Allergies  Allergies   Allergen Reactions   • Bolanos Swelling     Swollen lip when she eats any kind of bolanos   • Other Environmental Rash     Some perfumes and hand        DIET  No orders of the defined types were placed in this encounter.      ACTIVITY  As tolerated.  Weight bearing as tolerated    CONSULTATIONS  None    PROCEDURES  None    LABORATORY  Lab Results   Component Value Date    SODIUM 136 04/18/2020    POTASSIUM 3.9 04/18/2020    CHLORIDE 104  04/18/2020    CO2 22 04/18/2020    GLUCOSE 95 04/18/2020    BUN 14 04/18/2020    CREATININE 0.66 04/18/2020        Lab Results   Component Value Date    WBC 9.2 04/18/2020    HEMOGLOBIN 14.1 04/18/2020    HEMATOCRIT 41.2 04/18/2020    PLATELETCT 288 04/18/2020        Total time of the discharge process exceeds 40 minutes.

## 2020-04-18 NOTE — PROGRESS NOTES
[id number]: Aox4. VSS on RA. Independent in room, ambulating with steady gait. Pt d/c'd home via private car. Pt sent with all personal belongings

## 2020-04-18 NOTE — PROGRESS NOTES
Assumed care for pt at 1900. Pt sitting up in bed A&Ox4. Call bell in reach, bed in low and locked position. Pt denies any pain or needs at this time.

## 2020-04-18 NOTE — DISCHARGE INSTRUCTIONS
Discharge Instructions    Discharged to home by car with relative. Discharged via walking, hospital escort: Yes.  Special equipment needed: Not Applicable    Be sure to schedule a follow-up appointment with your primary care doctor or any specialists as instructed.     Discharge Plan:   Diet Plan: Discussed  Activity Level: Discussed  Confirmed Follow up Appointment: Patient to Call and Schedule Appointment  Confirmed Symptoms Management: Discussed  Medication Reconciliation Updated: No (Comments)  Influenza Vaccine Indication: Not indicated: Previously immunized this influenza season and > 8 years of age    I understand that a diet low in cholesterol, fat, and sodium is recommended for good health. Unless I have been given specific instructions below for another diet, I accept this instruction as my diet prescription.   Other diet: Regular    Special Instructions: None    · Is patient discharged on Warfarin / Coumadin?   No     Depression / Suicide Risk    As you are discharged from this [facility] facility, it is important to learn how to keep safe from harming yourself.    Recognize the warning signs:  · Abrupt changes in personality, positive or negative- including increase in energy   · Giving away possessions  · Change in eating patterns- significant weight changes-  positive or negative  · Change in sleeping patterns- unable to sleep or sleeping all the time   · Unwillingness or inability to communicate  · Depression  · Unusual sadness, discouragement and loneliness  · Talk of wanting to die  · Neglect of personal appearance   · Rebelliousness- reckless behavior  · Withdrawal from people/activities they love  · Confusion- inability to concentrate     If you or a loved one observes any of these behaviors or has concerns about self-harm, here's what you can do:  · Talk about it- your feelings and reasons for harming yourself  · Remove any means that you might use to hurt yourself (examples: pills, rope,  extension cords, firearm)  · Get professional help from the community (Mental Health, Substance Abuse, psychological counseling)  · Do not be alone:Call your Safe Contact- someone whom you trust who will be there for you.  · Call your local CRISIS HOTLINE 598-7930 or 783-551-9782  · Call your local Children's Mobile Crisis Response Team [place] (164) 935-5542 or www.eSolar  · Call the toll free National Suicide Prevention Hotlines   · National Suicide Prevention Lifeline 839-448-JZVO (6277)  · National Hope Line Network 800-SUICIDE (715-3570)

## 2020-04-18 NOTE — CARE PLAN
Problem: Communication  Goal: The ability to communicate needs accurately and effectively will improve  Outcome: PROGRESSING AS EXPECTED  Note: Patient communicates needs effectively     Problem: Safety  Goal: Will remain free from falls  Note: Pt uses call bell appropriately when needing assistance, call bell in patients reach

## 2020-04-18 NOTE — CARE PLAN
Problem: Communication  Goal: The ability to communicate needs accurately and effectively will improve  Outcome: MET     Problem: Safety  Goal: Will remain free from injury  Outcome: MET  Goal: Will remain free from falls  Outcome: MET     Problem: Infection  Goal: Will remain free from infection  Outcome: MET  Intervention: Assess signs and symptoms of infection  Note: Pt is afebrile, WBC <74058, Bcx (-)     Problem: Venous Thromboembolism (VTW)/Deep Vein Thrombosis (DVT) Prevention:  Goal: Patient will participate in Venous Thrombosis (VTE)/Deep Vein Thrombosis (DVT)Prevention Measures  Outcome: MET     Problem: Bowel/Gastric:  Goal: Normal bowel function is maintained or improved  Outcome: MET  Goal: Will not experience complications related to bowel motility  Outcome: MET     Problem: Knowledge Deficit  Goal: Knowledge of disease process/condition, treatment plan, diagnostic tests, and medications will improve  Outcome: MET  Intervention: Explain information regarding disease process/condition, treatment plan, diagnostic tests, and medications and document in education  Note: Pt updated on POC  Goal: Knowledge of the prescribed therapeutic regimen will improve  Outcome: MET     Problem: Discharge Barriers/Planning  Goal: Patient's continuum of care needs will be met  Outcome: MET     Problem: Fluid Volume:  Goal: Will maintain balanced intake and output  Outcome: MET     Problem: Pain Management  Goal: Pain level will decrease to patient's comfort goal  Outcome: MET

## 2020-04-21 LAB
BACTERIA BLD CULT: NORMAL
BACTERIA BLD CULT: NORMAL
SIGNIFICANT IND 70042: NORMAL
SIGNIFICANT IND 70042: NORMAL
SITE SITE: NORMAL
SITE SITE: NORMAL
SOURCE SOURCE: NORMAL
SOURCE SOURCE: NORMAL

## 2020-04-22 ENCOUNTER — OFFICE VISIT (OUTPATIENT)
Dept: MEDICAL GROUP | Facility: LAB | Age: 25
End: 2020-04-22
Payer: COMMERCIAL

## 2020-04-22 VITALS
OXYGEN SATURATION: 98 % | BODY MASS INDEX: 35.85 KG/M2 | SYSTOLIC BLOOD PRESSURE: 118 MMHG | TEMPERATURE: 98.6 F | DIASTOLIC BLOOD PRESSURE: 68 MMHG | HEART RATE: 111 BPM | HEIGHT: 60 IN | WEIGHT: 182.6 LBS

## 2020-04-22 DIAGNOSIS — A41.9 SEPSIS, DUE TO UNSPECIFIED ORGANISM, UNSPECIFIED WHETHER ACUTE ORGAN DYSFUNCTION PRESENT (HCC): ICD-10-CM

## 2020-04-22 PROCEDURE — 99214 OFFICE O/P EST MOD 30 MIN: CPT | Performed by: FAMILY MEDICINE

## 2020-04-22 ASSESSMENT — FIBROSIS 4 INDEX: FIB4 SCORE: 0.34

## 2020-04-22 NOTE — PROGRESS NOTES
Subjective:     CC: Hospital Fu    HPI:   Nathaly presents today with    Hospital Fu:  Patient here for hospital follow-up.  Patient initially was admitted for unresolved bacteremia secondary to UTI.  She was given IV antibiotics while inpatient and discharged.  Since discharge she has been feeling great.  She denies any fevers, chills, night sweats.  She is here today for FMLA.  She was told to either work while she had sepsis or to resign.  She has used all of her sick leave to her other chronic issues.    Social History     Tobacco Use   • Smoking status: Never Smoker   • Smokeless tobacco: Never Used   Substance Use Topics   • Alcohol use: No   • Drug use: No       Current Outpatient Medications Ordered in Epic   Medication Sig Dispense Refill   • ciprofloxacin (CIPRO) 500 MG Tab Take 1 Tab by mouth 2 times a day for 4 days. 8 Tab 0   • ibuprofen (MOTRIN) 400 MG Tab Take 1 Tab by mouth every 6 hours as needed. 30 Tab 0   • omeprazole (PRILOSEC) 20 MG delayed-release capsule Take 1 Cap by mouth every day. 30 Cap 0   • acetaminophen (TYLENOL) 500 MG Tab Take 1,000 mg by mouth every 6 hours as needed.       No current Epic-ordered facility-administered medications on file.        Allergies:  Daigle and Other environmental    ROS:  Gen: no fevers/chill, no changes in weight  Eyes: no changes in vision  ENT: no sore throat, no hearing loss, no bloody nose  Pulm: no sob, no cough  CV: no chest pain, no palpitations  GI: no nausea/vomiting, no diarrhea  : no dysuria  MSk: no myalgias  Skin: no rash  Neuro: no headaches, no numbness/tingling  Heme/Lymph: no easy bruising      Objective:       Exam:  /68 (BP Location: Left arm, Patient Position: Sitting)   Pulse (!) 111   Temp 37 °C (98.6 °F) (Temporal)   Ht 1.524 m (5')   Wt 82.8 kg (182 lb 9.6 oz)   SpO2 98%   BMI 35.66 kg/m²  Body mass index is 35.66 kg/m².    Gen: Alert and oriented, No apparent distress.  Neck: Neck is supple without  lymphadenopathy.  Lungs: Normal effort, CTA bilaterally, no wheezes, rhonchi, or rales  CV: Regular rate and rhythm. No murmurs, rubs, or gallops.               Ext: No clubbing, cyanosis, edema.      Assessment & Plan:     24 y.o. female with the following -     1. Sepsis, due to unspecified organism, unspecified whether acute organ dysfunction present (HCC)  Resolved.  LA paperwork completed for her hospital admission.  Patient establish with new provider.      Please note that this dictation was created using voice recognition software. I have made every reasonable attempt to correct obvious errors, but I expect that there are errors of grammar and possibly content that I did not discover before finalizing the note.

## 2020-04-29 NOTE — DOCUMENTATION QUERY
[facility]                                                                       Query Response Note      PATIENT:               JOSE LUIS VERAS  ACCT #:                  [id number]  MRN:                     [id number]  :                      1995  ADMIT DATE:       2020 6:01 PM  DISCH DATE:        2020 12:18 PM  RESPONDING  PROVIDER #:        [id number]           QUERY TEXT:    Pt has documented sepsis noted as ?rule out? or similar terminology such as suspected, probable, etc.  Please clarify status of this condition:    NOTE:  If an appropriate response is not listed below, please respond with a new note.       The patient's Clinical Indicators include:  Sepsis is documented in the h&p, however all other notes document bacteremia. Per h&p patient had SIRS criteria including tachycardia and leukocytosis with WBC greater than 12,000, progress note  states leukocytosis could be stress related.  Options provided:   -- Sepsis is ruled in   -- Sepsis is  ruled out   -- Unable to determine      Query created by: Mary Oliva on 2020 2:31 PM    RESPONSE TEXT:    Sepsis is ruled out          Electronically signed by:  JACQUELINE LAWLER MD 2020 11:12 AM

## 2020-05-13 ENCOUNTER — TELEPHONE (OUTPATIENT)
Dept: MEDICAL GROUP | Facility: LAB | Age: 25
End: 2020-05-13

## 2020-05-13 NOTE — TELEPHONE ENCOUNTER
rolym for pt to give me a call back regarding her FMLA paperwork dr payne can only do it for the visit she had in the hospital with the infection not her migraines. She was advised during last visit to establish with a different provider since ashwin zuñiga is no longer in this office. If she would like she can do FMLA for the infection she was seen for or it'll be denied again

## 2020-05-26 ENCOUNTER — APPOINTMENT (OUTPATIENT)
Dept: MEDICAL GROUP | Facility: LAB | Age: 25
End: 2020-05-26
Payer: COMMERCIAL

## 2020-05-28 ENCOUNTER — OFFICE VISIT (OUTPATIENT)
Dept: MEDICAL GROUP | Facility: LAB | Age: 25
End: 2020-05-28
Payer: COMMERCIAL

## 2020-05-28 VITALS
TEMPERATURE: 99.3 F | HEART RATE: 95 BPM | RESPIRATION RATE: 16 BRPM | WEIGHT: 189 LBS | BODY MASS INDEX: 37.11 KG/M2 | HEIGHT: 60 IN | SYSTOLIC BLOOD PRESSURE: 118 MMHG | OXYGEN SATURATION: 98 % | DIASTOLIC BLOOD PRESSURE: 70 MMHG

## 2020-05-28 DIAGNOSIS — L20.89 OTHER ATOPIC DERMATITIS: ICD-10-CM

## 2020-05-28 DIAGNOSIS — Z82.69 FAMILY HISTORY OF SYSTEMIC LUPUS ERYTHEMATOSUS (SLE) IN MOTHER: ICD-10-CM

## 2020-05-28 DIAGNOSIS — G43.109 MIGRAINE WITH AURA AND WITHOUT STATUS MIGRAINOSUS, NOT INTRACTABLE: ICD-10-CM

## 2020-05-28 DIAGNOSIS — Z13.29 SCREENING FOR THYROID DISORDER: ICD-10-CM

## 2020-05-28 DIAGNOSIS — Z13.21 ENCOUNTER FOR VITAMIN DEFICIENCY SCREENING: ICD-10-CM

## 2020-05-28 PROBLEM — Z30.42 ENCOUNTER FOR SURVEILLANCE OF INJECTABLE CONTRACEPTIVE: Status: RESOLVED | Noted: 2019-06-28 | Resolved: 2020-05-28

## 2020-05-28 PROBLEM — M54.9 MUSCULOSKELETAL BACK PAIN: Status: RESOLVED | Noted: 2020-04-17 | Resolved: 2020-05-28

## 2020-05-28 PROBLEM — R78.81 BACTEREMIA: Status: RESOLVED | Noted: 2020-04-17 | Resolved: 2020-05-28

## 2020-05-28 PROCEDURE — 99214 OFFICE O/P EST MOD 30 MIN: CPT | Performed by: FAMILY MEDICINE

## 2020-05-28 RX ORDER — SUMATRIPTAN 100 MG/1
100 TABLET, FILM COATED ORAL
Qty: 10 TAB | Refills: 3 | Status: SHIPPED | OUTPATIENT
Start: 2020-05-28 | End: 2021-07-24

## 2020-05-28 ASSESSMENT — PATIENT HEALTH QUESTIONNAIRE - PHQ9: CLINICAL INTERPRETATION OF PHQ2 SCORE: 0

## 2020-05-28 ASSESSMENT — FIBROSIS 4 INDEX: FIB4 SCORE: 0.34

## 2020-05-28 NOTE — ASSESSMENT & PLAN NOTE
Reports  right temporal headaches described as: sharp, pounding with nausea,vomiting, photophobia and auras described as visual scintillations, without radiation  Present since age 16.  Usual frequency is 5 times a month - worse in the summer  Headaches are relieved by Excedrin, ibuprofen, Imitrex  50 mg oral  Previous treatment and prevention include: no previous   Known triggers include: sun exposure weather changes, poor sleep.   Denies difficulty with speech or swallowing, facial numbness or tingling, focal weakness. Denies sore throat or cough, fever, sinus congestion, ear pain, tooth clenching or grinding.  Family Hx: migraine headaches in mother  Prior imaging: no

## 2020-05-29 NOTE — ASSESSMENT & PLAN NOTE
Patient would like to get some autoimmune testing given that her mother has both SLE and Sjogren's syndrome

## 2020-05-29 NOTE — PROGRESS NOTES
Chief Complaint   Patient presents with   • Establish Care     [facility] patient   • Rash     on neck    • Hospital Follow-up     April 2020         Nathaly Luo is a 24 y.o. female here to establish care and for evaluation and management of:        HPI:    Migraine with aura and without status migrainosus, not intractable  Reports  right temporal headaches described as: sharp, pounding with nausea,vomiting, photophobia and auras described as visual scintillations, without radiation  Present since age 16.  Usual frequency is 5 times a month - worse in the summer  Headaches are relieved by Excedrin, ibuprofen, Imitrex  50 mg oral  Previous treatment and prevention include: no previous   Known triggers include: sun exposure weather changes, poor sleep.   Denies difficulty with speech or swallowing, facial numbness or tingling, focal weakness. Denies sore throat or cough, fever, sinus congestion, ear pain, tooth clenching or grinding.  Family Hx: migraine headaches in mother  Prior imaging: no          Family history of systemic lupus erythematosus (SLE) in mother  Patient would like to get some autoimmune testing given that her mother has both SLE and Sjogren's syndrome    Other atopic dermatitis  She reports that when she gets exposure to sun on her right side of her neck she develops of a rash.  She is using over-the-counter hydrocortisone which helps.  She is concerned that this may be SLE.      Allergies   Allergen Reactions   • Bolanos Swelling     Swollen lip when she eats any kind of bolanos   • Other Environmental Rash     Some perfumes and hand        Current medicines (including changes today)  Current Outpatient Medications   Medication Sig Dispense Refill   • sumatriptan (IMITREX) 100 MG tablet Take 1 Tab by mouth Once PRN for Migraine for up to 1 dose. 10 Tab 3   • ibuprofen (MOTRIN) 400 MG Tab Take 1 Tab by mouth every 6 hours as needed. 30 Tab 0   • omeprazole (PRILOSEC) 20 MG  delayed-release capsule Take 1 Cap by mouth every day. 30 Cap 0   • acetaminophen (TYLENOL) 500 MG Tab Take 1,000 mg by mouth every 6 hours as needed.       No current facility-administered medications for this visit.      She  has a past medical history of Migraine. She also has no past medical history of Diabetes (HCC) or Hypertension.  She  has no past surgical history on file.  Social History     Tobacco Use   • Smoking status: Never Smoker   • Smokeless tobacco: Never Used   Substance Use Topics   • Alcohol use: No   • Drug use: No     Social History     Social History Narrative   • Not on file     Family History   Problem Relation Age of Onset   • Diabetes Mother    • Autoimmune Disease Mother    • Hypertension Mother    • Cancer Maternal Aunt         ovarian     Family Status   Relation Name Status   • Mo  Alive        Lupus/ sjogrens   • Fa  Alive   • Bro  Alive   • MAunt  Alive        Ovarian         ROS  No fever or chills.  No nausea or vomiting.  No chest pain or palpitations.  No cough or SOB.  No pain with urination or hematuria.  No black or bloody stools.  All other systems reviewed and are negative     Objective:     /70 (BP Location: Right arm, Patient Position: Sitting, BP Cuff Size: Adult)   Pulse 95   Temp 37.4 °C (99.3 °F) (Temporal)   Resp 16   Ht 1.524 m (5')   Wt 85.7 kg (189 lb)   SpO2 98%  Body mass index is 36.91 kg/m².  Physical Exam:      Well developed, well nourished.  Alert, oriented in no acute distress.  Psych: Eye contact is good, speech goal directed, affect calm  Eyes: conjunctiva non-injected, sclera non-icteric.  Neck Supple.  No adenopathy or masses in the neck or supraclavicular regions. No thyromegaly  Lungs: clear to auscultation bilaterally with good excursion. No wheezes or rhonchi  CV: regular rate and rhythm. No murmur  Skin - erythematous, rough rash on the right side of the anterior neck      Assessment and Plan:   The following treatment plan was  discussed    1. Migraine with aura and without status migrainosus, not intractable  Increase hydration - recommend 3 liters daily.  Increase sumatriptan to 100 mg as needed.  - sumatriptan (IMITREX) 100 MG tablet; Take 1 Tab by mouth Once PRN for Migraine for up to 1 dose.  Dispense: 10 Tab; Refill: 3    2. Other atopic dermatitis  Offered patient a stronger steroid cream but she does not want at this time.    3. Family history of systemic lupus erythematosus (SLE) in mother  Check CARLOS and sed rate.  Await results  - CARLOS REFLEXIVE PROFILE; Future  - Sed Rate; Future    4. Screening for thyroid disorder  Screening labs ordered.  Await results for counseling.    - TSH; Future    5. Encounter for vitamin deficiency screening  Screening labs ordered.  Await results for counseling.    - VITAMIN D,25 HYDROXY; Future      Records requested.    Any change or worsening of signs or symptoms, patient encouraged to follow-up or report to the emergency room for further evaluation. Patient understands and agrees.    Followup: Return in about 6 months (around 11/28/2020).

## 2020-05-29 NOTE — ASSESSMENT & PLAN NOTE
She reports that when she gets exposure to sun on her right side of her neck she develops of a rash.  She is using over-the-counter hydrocortisone which helps.  She is concerned that this may be SLE.

## 2020-06-01 ENCOUNTER — APPOINTMENT (OUTPATIENT)
Dept: MEDICAL GROUP | Facility: LAB | Age: 25
End: 2020-06-01
Payer: COMMERCIAL

## 2020-06-01 DIAGNOSIS — Z30.09 FAMILY PLANNING: ICD-10-CM

## 2020-06-01 RX ORDER — MEDROXYPROGESTERONE ACETATE 150 MG/ML
150 INJECTION, SUSPENSION INTRAMUSCULAR ONCE
OUTPATIENT
Start: 2020-06-01 | End: 2020-06-01

## 2020-06-14 ENCOUNTER — OFFICE VISIT (OUTPATIENT)
Dept: URGENT CARE | Facility: CLINIC | Age: 25
End: 2020-06-14
Payer: COMMERCIAL

## 2020-06-14 VITALS
WEIGHT: 185 LBS | HEART RATE: 100 BPM | TEMPERATURE: 98.5 F | DIASTOLIC BLOOD PRESSURE: 70 MMHG | RESPIRATION RATE: 18 BRPM | BODY MASS INDEX: 36.13 KG/M2 | OXYGEN SATURATION: 98 % | SYSTOLIC BLOOD PRESSURE: 112 MMHG

## 2020-06-14 DIAGNOSIS — N30.01 ACUTE CYSTITIS WITH HEMATURIA: ICD-10-CM

## 2020-06-14 LAB
APPEARANCE UR: CLEAR
BILIRUB UR STRIP-MCNC: NORMAL MG/DL
COLOR UR AUTO: YELLOW
GLUCOSE UR STRIP.AUTO-MCNC: NORMAL MG/DL
KETONES UR STRIP.AUTO-MCNC: NORMAL MG/DL
LEUKOCYTE ESTERASE UR QL STRIP.AUTO: NORMAL
NITRITE UR QL STRIP.AUTO: NORMAL
PH UR STRIP.AUTO: 7.5 [PH] (ref 5–8)
PROT UR QL STRIP: NORMAL MG/DL
RBC UR QL AUTO: NORMAL
SP GR UR STRIP.AUTO: 1.02
UROBILINOGEN UR STRIP-MCNC: 0.2 MG/DL

## 2020-06-14 PROCEDURE — 99213 OFFICE O/P EST LOW 20 MIN: CPT | Performed by: FAMILY MEDICINE

## 2020-06-14 PROCEDURE — 81002 URINALYSIS NONAUTO W/O SCOPE: CPT | Performed by: FAMILY MEDICINE

## 2020-06-14 RX ORDER — NITROFURANTOIN 25; 75 MG/1; MG/1
100 CAPSULE ORAL EVERY 12 HOURS
Qty: 10 CAP | Refills: 0 | Status: SHIPPED | OUTPATIENT
Start: 2020-06-14 | End: 2020-06-19

## 2020-06-14 ASSESSMENT — ENCOUNTER SYMPTOMS
SORE THROAT: 0
VOMITING: 0
CHILLS: 0
FLANK PAIN: 0
FEVER: 0
COUGH: 0
NAUSEA: 0
SHORTNESS OF BREATH: 0

## 2020-06-14 ASSESSMENT — FIBROSIS 4 INDEX: FIB4 SCORE: 0.34

## 2020-06-14 NOTE — PROGRESS NOTES
Subjective:   Nathaly Luo is a 24 y.o. female who presents for Dysuria (Couple days urinary discomfort)        Dysuria    This is a new problem. The current episode started yesterday. The problem occurs every urination. There has been no fever. Associated symptoms include frequency and urgency. Pertinent negatives include no chills, discharge, flank pain, hesitancy, nausea or vomiting. Associated symptoms comments: Similar symptoms with previous UTI. previous UTI     PMH:  has a past medical history of Migraine. She also has no past medical history of Diabetes (HCC) or Hypertension.  MEDS:   Current Outpatient Medications:   •  nitrofurantoin (MACROBID) 100 MG Cap, Take 1 Cap by mouth every 12 hours for 5 days., Disp: 10 Cap, Rfl: 0  •  sumatriptan (IMITREX) 100 MG tablet, Take 1 Tab by mouth Once PRN for Migraine for up to 1 dose., Disp: 10 Tab, Rfl: 3  •  omeprazole (PRILOSEC) 20 MG delayed-release capsule, Take 1 Cap by mouth every day., Disp: 30 Cap, Rfl: 0  •  ibuprofen (MOTRIN) 400 MG Tab, Take 1 Tab by mouth every 6 hours as needed. (Patient not taking: Reported on 6/14/2020), Disp: 30 Tab, Rfl: 0  •  acetaminophen (TYLENOL) 500 MG Tab, Take 1,000 mg by mouth every 6 hours as needed., Disp: , Rfl:   ALLERGIES:   Allergies   Allergen Reactions   • Bolanos Swelling     Swollen lip when she eats any kind of bolanos   • Other Environmental Rash     Some perfumes and hand      SURGHX: No past surgical history on file.  SOCHX:  reports that she has never smoked. She has never used smokeless tobacco. She reports that she does not drink alcohol or use drugs.  FH:   Family History   Problem Relation Age of Onset   • Diabetes Mother    • Autoimmune Disease Mother    • Hypertension Mother    • Cancer Maternal Aunt         ovarian     Review of Systems   Constitutional: Negative for chills and fever.   HENT: Negative for sore throat.    Respiratory: Negative for cough and shortness of breath.     Cardiovascular: Negative for chest pain.   Gastrointestinal: Negative for nausea and vomiting.   Genitourinary: Positive for dysuria, frequency and urgency. Negative for flank pain and hesitancy.        Objective:   /70   Pulse 100   Temp 36.9 °C (98.5 °F) (Temporal)   Resp 18   Wt 83.9 kg (185 lb)   LMP  (LMP Unknown)   SpO2 98%   BMI 36.13 kg/m²   Physical Exam  Vitals signs and nursing note reviewed.   Constitutional:       General: She is not in acute distress.     Appearance: She is well-developed.   HENT:      Head: Normocephalic and atraumatic.      Right Ear: External ear normal.      Left Ear: External ear normal.      Nose: Nose normal.      Mouth/Throat:      Mouth: Mucous membranes are moist.   Eyes:      Conjunctiva/sclera: Conjunctivae normal.   Cardiovascular:      Rate and Rhythm: Normal rate.   Pulmonary:      Effort: Pulmonary effort is normal. No respiratory distress.      Breath sounds: Normal breath sounds.   Abdominal:      General: There is no distension.   Musculoskeletal: Normal range of motion.   Skin:     General: Skin is warm and dry.   Neurological:      General: No focal deficit present.      Mental Status: She is alert and oriented to person, place, and time. Mental status is at baseline.      Gait: Gait (gait at baseline) normal.   Psychiatric:         Judgment: Judgment normal.           Assessment/Plan:   1. Acute cystitis with hematuria  - POCT Urinalysis  - nitrofurantoin (MACROBID) 100 MG Cap; Take 1 Cap by mouth every 12 hours for 5 days.  Dispense: 10 Cap; Refill: 0      Discussed close monitoring, return precautions, and supportive measures of maintaining adequate fluid hydration and caloric intake, relative rest and symptom management as needed for pain and/or fever.    Differential diagnosis, natural history, supportive care, and indications for immediate follow-up discussed.     Advised the patient to follow-up with the primary care physician for recheck,  reevaluation, and consideration of further management.    Please note that this dictation was created using voice recognition software. I have worked with consultants from the vendor as well as technical experts from [facility] to optimize the interface. I have made every reasonable attempt to correct obvious errors, but I expect that there are errors of grammar and possibly content that I did not discover before finalizing the note.

## 2020-06-19 ENCOUNTER — HOSPITAL ENCOUNTER (OUTPATIENT)
Facility: MEDICAL CENTER | Age: 25
End: 2020-06-19
Attending: NURSE PRACTITIONER
Payer: COMMERCIAL

## 2020-06-19 ENCOUNTER — OFFICE VISIT (OUTPATIENT)
Dept: URGENT CARE | Facility: CLINIC | Age: 25
End: 2020-06-19
Payer: COMMERCIAL

## 2020-06-19 VITALS
RESPIRATION RATE: 16 BRPM | WEIGHT: 175 LBS | OXYGEN SATURATION: 98 % | BODY MASS INDEX: 34.36 KG/M2 | DIASTOLIC BLOOD PRESSURE: 76 MMHG | HEIGHT: 60 IN | TEMPERATURE: 98.8 F | HEART RATE: 94 BPM | SYSTOLIC BLOOD PRESSURE: 114 MMHG

## 2020-06-19 DIAGNOSIS — N30.01 ACUTE CYSTITIS WITH HEMATURIA: ICD-10-CM

## 2020-06-19 LAB
APPEARANCE UR: CLEAR
BILIRUB UR STRIP-MCNC: NORMAL MG/DL
COLOR UR AUTO: YELLOW
GLUCOSE UR STRIP.AUTO-MCNC: NORMAL MG/DL
KETONES UR STRIP.AUTO-MCNC: NORMAL MG/DL
LEUKOCYTE ESTERASE UR QL STRIP.AUTO: NORMAL
NITRITE UR QL STRIP.AUTO: NORMAL
PH UR STRIP.AUTO: 5.5 [PH] (ref 5–8)
PROT UR QL STRIP: NORMAL MG/DL
RBC UR QL AUTO: NORMAL
SP GR UR STRIP.AUTO: 1.02
UROBILINOGEN UR STRIP-MCNC: 0.2 MG/DL

## 2020-06-19 PROCEDURE — 81002 URINALYSIS NONAUTO W/O SCOPE: CPT | Performed by: NURSE PRACTITIONER

## 2020-06-19 PROCEDURE — 99214 OFFICE O/P EST MOD 30 MIN: CPT | Performed by: NURSE PRACTITIONER

## 2020-06-19 PROCEDURE — 87086 URINE CULTURE/COLONY COUNT: CPT

## 2020-06-19 RX ORDER — PHENAZOPYRIDINE HYDROCHLORIDE 200 MG/1
200 TABLET, FILM COATED ORAL 3 TIMES DAILY
Qty: 6 TAB | Refills: 0 | Status: SHIPPED | OUTPATIENT
Start: 2020-06-19 | End: 2020-06-21

## 2020-06-19 RX ORDER — NITROFURANTOIN 25; 75 MG/1; MG/1
100 CAPSULE ORAL EVERY 12 HOURS
Qty: 10 CAP | Refills: 0 | Status: SHIPPED | OUTPATIENT
Start: 2020-06-19 | End: 2020-06-24

## 2020-06-19 ASSESSMENT — ENCOUNTER SYMPTOMS
FLANK PAIN: 1
SWEATS: 0
CHILLS: 0
VOMITING: 0
NAUSEA: 0

## 2020-06-19 ASSESSMENT — FIBROSIS 4 INDEX: FIB4 SCORE: 0.34

## 2020-06-19 ASSESSMENT — PAIN SCALES - GENERAL: PAINLEVEL: 4=SLIGHT-MODERATE PAIN

## 2020-06-19 NOTE — LETTER
June 19, 2020    To Whom It May Concern:         This is confirmation that Nathaly Luo attended her scheduled appointment with AKIKO Rivera on 6/19/20.  She was seen for an acute but not contagious health issue.         If you have any questions please do not hesitate to call me at the phone number listed below.    Sincerely,          MILADIS Rivera.  [phone]

## 2020-06-19 NOTE — PROGRESS NOTES
Subjective:   Nathaly Luo is a 24 y.o. female who presents for Dysuria (pain  with urination x 6 days blood in urine )            Dysuria    This is a recurrent problem. The current episode started in the past 7 days. The problem occurs every urination (Has had several repeated UTI's with 2 hospital admissions.). The problem has been unchanged. The quality of the pain is described as burning. The pain is at a severity of 3/10. The pain is mild. The maximum temperature recorded prior to her arrival was 100 - 100.9 F. The fever has been present for less than 1 day. She is sexually active. There is no history of pyelonephritis. Associated symptoms include flank pain (left side only), frequency, hematuria, hesitancy and urgency. Pertinent negatives include no chills, discharge, nausea, possible pregnancy, sweats or vomiting. She has tried increased fluids for the symptoms. The treatment provided no relief. Her past medical history is significant for recurrent UTIs.       Review of Systems   Constitutional: Negative for chills.   Gastrointestinal: Negative for nausea and vomiting.   Genitourinary: Positive for dysuria, flank pain (left side only), frequency, hematuria, hesitancy and urgency.   All other systems reviewed and are negative.      MEDS:   Current Outpatient Medications:   •  sumatriptan (IMITREX) 100 MG tablet, Take 1 Tab by mouth Once PRN for Migraine for up to 1 dose., Disp: 10 Tab, Rfl: 3  •  omeprazole (PRILOSEC) 20 MG delayed-release capsule, Take 1 Cap by mouth every day., Disp: 30 Cap, Rfl: 0  •  acetaminophen (TYLENOL) 500 MG Tab, Take 1,000 mg by mouth every 6 hours as needed., Disp: , Rfl:   •  nitrofurantoin (MACROBID) 100 MG Cap, Take 1 Cap by mouth every 12 hours for 5 days. (Patient not taking: Reported on 6/19/2020), Disp: 10 Cap, Rfl: 0  •  ibuprofen (MOTRIN) 400 MG Tab, Take 1 Tab by mouth every 6 hours as needed. (Patient not taking: Reported on 6/14/2020), Disp: 30 Tab, Rfl:  0  ALLERGIES:   Allergies   Allergen Reactions   • Bolanos Swelling     Swollen lip when she eats any kind of bolanos   • Other Environmental Rash     Some perfumes and hand        Patient's PMH, SocHx, SurgHx, FamHx, Drug allergies and medications were reviewed.     Objective:   /76   Pulse 94   Temp 37.1 °C (98.8 °F)   Resp 16   Ht 1.524 m (5')   Wt 79.4 kg (175 lb)   LMP  (LMP Unknown)   SpO2 98%   BMI 34.18 kg/m²     Physical Exam  Vitals signs and nursing note reviewed.   Constitutional:       General: She is awake.      Appearance: She is well-developed.   HENT:      Head: Normocephalic and atraumatic.   Eyes:      Conjunctiva/sclera: Conjunctivae normal.   Pulmonary:      Effort: Pulmonary effort is normal.      Breath sounds: Normal breath sounds.   Abdominal:      General: Abdomen is flat. Bowel sounds are normal.      Palpations: Abdomen is soft.      Tenderness: There is abdominal tenderness in the suprapubic area and left lower quadrant. There is left CVA tenderness.   Musculoskeletal: Normal range of motion.   Skin:     General: Skin is warm and dry.   Neurological:      Mental Status: She is alert.         Assessment/Plan:   1. Acute cystitis with hematuria  - REFERRAL TO UROLOGY  - nitrofurantoin (MACROBID) 100 MG Cap; Take 1 Cap by mouth every 12 hours for 5 days.  Dispense: 10 Cap; Refill: 0  - phenazopyridine (PYRIDIUM) 200 MG Tab; Take 1 Tab by mouth 3 times a day for 2 days.  Dispense: 6 Tab; Refill: 0    Due to several recent UTIs with hospital admissions, we will refer her to urology for further evaluation and care.  Push fluids, cranberry tablets.  Begin medications as listed.  Supportive care options also discussed.  Differential diagnosis, natural history, supportive care, and indications for immediate follow-up were discussed.     Return to urgent care clinic or PCP if current symptoms do not improve and/or worsening symptoms occur. Advised of signs and symptoms which  would warrant further evaluation and /or emergent evaluation in ER.  All questions answered and the patient agrees to the plan of care.     All questions answered and the patient agrees to the plan of care.

## 2020-06-21 DIAGNOSIS — N30.01 ACUTE CYSTITIS WITH HEMATURIA: ICD-10-CM

## 2020-06-21 LAB
AMBIGUOUS DTTM AMBI4: NORMAL
SIGNIFICANT IND 70042: NORMAL
SITE SITE: NORMAL
SOURCE SOURCE: NORMAL

## 2020-06-23 LAB
BACTERIA UR CULT: NORMAL
SIGNIFICANT IND 70042: NORMAL
SITE SITE: NORMAL
SOURCE SOURCE: NORMAL

## 2020-07-13 ENCOUNTER — OFFICE VISIT (OUTPATIENT)
Dept: MEDICAL GROUP | Facility: LAB | Age: 25
End: 2020-07-13
Payer: COMMERCIAL

## 2020-07-13 VITALS
OXYGEN SATURATION: 97 % | DIASTOLIC BLOOD PRESSURE: 70 MMHG | WEIGHT: 188 LBS | HEART RATE: 98 BPM | SYSTOLIC BLOOD PRESSURE: 110 MMHG | TEMPERATURE: 99 F | BODY MASS INDEX: 36.91 KG/M2 | HEIGHT: 60 IN | RESPIRATION RATE: 16 BRPM

## 2020-07-13 DIAGNOSIS — Z30.09 FAMILY PLANNING COUNSELING: ICD-10-CM

## 2020-07-13 DIAGNOSIS — R21 RASH: ICD-10-CM

## 2020-07-13 PROCEDURE — 99214 OFFICE O/P EST MOD 30 MIN: CPT | Performed by: FAMILY MEDICINE

## 2020-07-13 RX ORDER — KETOCONAZOLE 20 MG/G
CREAM TOPICAL
Qty: 60 G | Refills: 1 | Status: SHIPPED | OUTPATIENT
Start: 2020-07-13 | End: 2021-07-24

## 2020-07-13 ASSESSMENT — FIBROSIS 4 INDEX: FIB4 SCORE: 0.34

## 2020-07-13 NOTE — PATIENT INSTRUCTIONS
Body Ringworm  Body ringworm is an infection of the skin that often causes a ring-shaped rash. Body ringworm is also called tinea corporis.  Body ringworm can affect any part of your skin. This condition is easily spread from person to person (is very contagious).  What are the causes?  This condition is caused by fungi called dermatophytes. The condition develops when these fungi grow out of control on the skin.  You can get this condition if you touch a person or animal that has it. You can also get it if you share any items with an infected person or pet. These include:  · Clothing, bedding, and towels.  · Brushes or arias.  · Gym equipment.  · Any other object that has the fungus on it.  What increases the risk?  You are more likely to develop this condition if you:  · Play sports that involve close physical contact, such as wrestling.  · Sweat a lot.  · Live in areas that are hot and humid.  · Use public showers.  · Have a weakened immune system.  What are the signs or symptoms?  Symptoms of this condition include:  · Itchy, raised red spots and bumps.  · Red scaly patches.  · A ring-shaped rash. The rash may have:  ? A clear center.  ? Scales or red bumps at its center.  ? Redness near its borders.  ? Dry and scaly skin on or around it.  How is this diagnosed?  This condition can usually be diagnosed with a skin exam. A skin scraping may be taken from the affected area and examined under a microscope to see if the fungus is present.  How is this treated?  This condition may be treated with:  · An antifungal cream or ointment.  · An antifungal shampoo.  · Antifungal medicines. These may be prescribed if your ringworm:  ? Is severe.  ? Keeps coming back.  ? Lasts a long time.  Follow these instructions at home:  · Take over-the-counter and prescription medicines only as told by your health care provider.  · If you were given an antifungal cream or ointment:  ? Use it as told by your health care provider.  ? Wash  the infected area and dry it completely before applying the cream or ointment.  · If you were given an antifungal shampoo:  ? Use it as told by your health care provider.  ? Leave the shampoo on your body for 3-5 minutes before rinsing.  · While you have a rash:  ? Wear loose clothing to stop clothes from rubbing and irritating it.  ? Wash or change your bed sheets every night.  ? Disinfect or throw out items that may be infected.  ? Wash clothes and bed sheets in hot water.  ? Wash your hands often with soap and water. If soap and water are not available, use hand .  · If your pet has the same infection, take your pet to see a  for treatment.  How is this prevented?  · Take a bath or shower every day and after every time you work out or play sports.  · Dry your skin completely after bathing.  · Wear sandals or shoes in public places and showers.  · Change your clothes every day.  · Wash athletic clothes after each use.  · Do not share personal items with others.  · Avoid touching red patches of skin on other people.  · Avoid touching pets that have bald spots.  · If you touch an animal that has a bald spot, wash your hands.  Contact a health care provider if:  · Your rash continues to spread after 7 days of treatment.  · Your rash is not gone in 4 weeks.  · The area around your rash gets red, warm, tender, and swollen.  Summary  · Body ringworm is an infection of the skin that often causes a ring-shaped rash.  · This condition is easily spread from person to person (is very contagious).  · This condition may be treated with antifungal cream or ointment, antifungal shampoo, or antifungal medicines.  · Take over-the-counter and prescription medicines only as told by your health care provider.  This information is not intended to replace advice given to you by your health care provider. Make sure you discuss any questions you have with your health care provider.  Document Released: 12/15/2001  Document Revised: 08/16/2019 Document Reviewed: 08/16/2019  [employer] Patient Education © 2020 [employer]    Levonorgestrel intrauterine device (IUD)  What is this medicine?  LEVONORGESTREL IUD (CAESAR willingham) is a contraceptive (birth control) device. The device is placed inside the uterus by a healthcare professional. It is used to prevent pregnancy. This device can also be used to treat heavy bleeding that occurs during your period.  This medicine may be used for other purposes; ask your health care provider or pharmacist if you have questions.  COMMON BRAND NAME(S): Kyleena, LILETTA, Mirena, Lin  What should I tell my health care provider before I take this medicine?  They need to know if you have any of these conditions:  · abnormal Pap smear  · cancer of the breast, uterus, or cervix  · diabetes  · endometritis  · genital or pelvic infection now or in the past  · have more than one sexual partner or your partner has more than one partner  · heart disease  · history of an ectopic or tubal pregnancy  · immune system problems  · IUD in place  · liver disease or tumor  · problems with blood clots or take blood-thinners  · seizures  · use intravenous drugs  · uterus of unusual shape  · vaginal bleeding that has not been explained  · an unusual or allergic reaction to levonorgestrel, other hormones, silicone, or polyethylene, medicines, foods, dyes, or preservatives  · pregnant or trying to get pregnant  · breast-feeding  How should I use this medicine?  This device is placed inside the uterus by a health care professional.  Talk to your pediatrician regarding the use of this medicine in children. Special care may be needed.  Overdosage: If you think you have taken too much of this medicine contact a poison control center or emergency room at once.  NOTE: This medicine is only for you. Do not share this medicine with others.  What if I miss a dose?  This does not apply. Depending on the brand of device  you have inserted, the device will need to be replaced every 3 to 6 years if you wish to continue using this type of birth control.  What may interact with this medicine?  Do not take this medicine with any of the following medications:  · amprenavir  · bosentan  · fosamprenavir  This medicine may also interact with the following medications:  · aprepitant  · armodafinil  · barbiturate medicines for inducing sleep or treating seizures  · bexarotene  · boceprevir  · griseofulvin  · medicines to treat seizures like carbamazepine, ethotoin, felbamate, oxcarbazepine, phenytoin, topiramate  · modafinil  · pioglitazone  · rifabutin  · rifampin  · rifapentine  · some medicines to treat HIV infection like atazanavir, efavirenz, indinavir, lopinavir, nelfinavir, tipranavir, ritonavir  · Rolo's wort  · warfarin  This list may not describe all possible interactions. Give your health care provider a list of all the medicines, herbs, non-prescription drugs, or dietary supplements you use. Also tell them if you smoke, drink alcohol, or use illegal drugs. Some items may interact with your medicine.  What should I watch for while using this medicine?  Visit your doctor or health care professional for regular check ups. See your doctor if you or your partner has sexual contact with others, becomes HIV positive, or gets a sexual transmitted disease.  This product does not protect you against HIV infection (AIDS) or other sexually transmitted diseases.  You can check the placement of the IUD yourself by reaching up to the top of your vagina with clean fingers to feel the threads. Do not pull on the threads. It is a good habit to check placement after each menstrual period. Call your doctor right away if you feel more of the IUD than just the threads or if you cannot feel the threads at all.  The IUD may come out by itself. You may become pregnant if the device comes out. If you notice that the IUD has come out use a backup birth  control method like condoms and call your health care provider.  Using tampons will not change the position of the IUD and are okay to use during your period.  This IUD can be safely scanned with magnetic resonance imaging (MRI) only under specific conditions. Before you have an MRI, tell your healthcare provider that you have an IUD in place, and which type of IUD you have in place.  What side effects may I notice from receiving this medicine?  Side effects that you should report to your doctor or health care professional as soon as possible:  · allergic reactions like skin rash, itching or hives, swelling of the face, lips, or tongue  · fever, flu-like symptoms  · genital sores  · high blood pressure  · no menstrual period for 6 weeks during use  · pain, swelling, warmth in the leg  · pelvic pain or tenderness  · severe or sudden headache  · signs of pregnancy  · stomach cramping  · sudden shortness of breath  · trouble with balance, talking, or walking  · unusual vaginal bleeding, discharge  · yellowing of the eyes or skin  Side effects that usually do not require medical attention (report to your doctor or health care professional if they continue or are bothersome):  · acne  · breast pain  · change in sex drive or performance  · changes in weight  · cramping, dizziness, or faintness while the device is being inserted  · headache  · irregular menstrual bleeding within first 3 to 6 months of use  · nausea  This list may not describe all possible side effects. Call your doctor for medical advice about side effects. You may report side effects to FDA at 2-246-FDA-7471.  Where should I keep my medicine?  This does not apply.  NOTE: This sheet is a summary. It may not cover all possible information. If you have questions about this medicine, talk to your doctor, pharmacist, or health care provider.  © 2020 [employer] Standard (2019-10-29 13:22:01)  Etonogestrel implant  What is this medicine?  ETONOGESTREL (et oh  macarena NARESH tretavia) is a contraceptive (birth control) device. It is used to prevent pregnancy. It can be used for up to 3 years.  This medicine may be used for other purposes; ask your health care provider or pharmacist if you have questions.  COMMON BRAND NAME(S): Implanon, Nexplanon  What should I tell my health care provider before I take this medicine?  They need to know if you have any of these conditions:  · abnormal vaginal bleeding  · blood vessel disease or blood clots  · breast, cervical, endometrial, ovarian, liver, or uterine cancer  · diabetes  · gallbladder disease  · heart disease or recent heart attack  · high blood pressure  · high cholesterol or triglycerides  · kidney disease  · liver disease  · migraine headaches  · seizures  · stroke  · tobacco smoker  · an unusual or allergic reaction to etonogestrel, anesthetics or antiseptics, other medicines, foods, dyes, or preservatives  · pregnant or trying to get pregnant  · breast-feeding  How should I use this medicine?  This device is inserted just under the skin on the inner side of your upper arm by a health care professional.  Talk to your pediatrician regarding the use of this medicine in children. Special care may be needed.  Overdosage: If you think you have taken too much of this medicine contact a poison control center or emergency room at once.  NOTE: This medicine is only for you. Do not share this medicine with others.  What if I miss a dose?  This does not apply.  What may interact with this medicine?  Do not take this medicine with any of the following medications:  · amprenavir  · fosamprenavir  This medicine may also interact with the following medications:  · acitretin  · aprepitant  · armodafinil  · bexarotene  · bosentan  · carbamazepine  · certain medicines for fungal infections like fluconazole, ketoconazole, itraconazole and voriconazole  · certain medicines to treat hepatitis, HIV or  AIDS  · cyclosporine  · felbamate  · griseofulvin  · lamotrigine  · modafinil  · oxcarbazepine  · phenobarbital  · phenytoin  · primidone  · rifabutin  · rifampin  · rifapentine  · Rolo's wort  · topiramate  This list may not describe all possible interactions. Give your health care provider a list of all the medicines, herbs, non-prescription drugs, or dietary supplements you use. Also tell them if you smoke, drink alcohol, or use illegal drugs. Some items may interact with your medicine.  What should I watch for while using this medicine?  This product does not protect you against HIV infection (AIDS) or other sexually transmitted diseases.  You should be able to feel the implant by pressing your fingertips over the skin where it was inserted. Contact your doctor if you cannot feel the implant, and use a non-hormonal birth control method (such as condoms) until your doctor confirms that the implant is in place. Contact your doctor if you think that the implant may have broken or become bent while in your arm.  You will receive a user card from your health care provider after the implant is inserted. The card is a record of the location of the implant in your upper arm and when it should be removed. Keep this card with your health records.  What side effects may I notice from receiving this medicine?  Side effects that you should report to your doctor or health care professional as soon as possible:  · allergic reactions like skin rash, itching or hives, swelling of the face, lips, or tongue  · breast lumps, breast tissue changes, or discharge  · breathing problems  · changes in emotions or moods  · if you feel that the implant may have broken or bent while in your arm  · high blood pressure  · pain, irritation, swelling, or bruising at the insertion site  · scar at site of insertion  · signs of infection at the insertion site such as fever, and skin redness, pain or discharge  · signs and symptoms of a blood  clot such as breathing problems; changes in vision; chest pain; severe, sudden headache; pain, swelling, warmth in the leg; trouble speaking; sudden numbness or weakness of the face, arm or leg  · signs and symptoms of liver injury like dark yellow or brown urine; general ill feeling or flu-like symptoms; light-colored stools; loss of appetite; nausea; right upper belly pain; unusually weak or tired; yellowing of the eyes or skin  · unusual vaginal bleeding, discharge  Side effects that usually do not require medical attention (report to your doctor or health care professional if they continue or are bothersome):  · acne  · breast pain or tenderness  · headache  · irregular menstrual bleeding  · nausea  This list may not describe all possible side effects. Call your doctor for medical advice about side effects. You may report side effects to FDA at 4-721-FDA-4497.  Where should I keep my medicine?  This drug is given in a hospital or clinic and will not be stored at home.  NOTE: This sheet is a summary. It may not cover all possible information. If you have questions about this medicine, talk to your doctor, pharmacist, or health care provider.  © 2020 [employer]vier/Gold Standard (2018-11-06 14:11:42)

## 2020-07-15 NOTE — ASSESSMENT & PLAN NOTE
Patient reports the rash on her neck has not improved and the steroids seem to make it hurt more.  It seems to flare after she is worked out and is sweaty.  She denies any new contacts.  It gets very dry and crusting.

## 2020-07-15 NOTE — ASSESSMENT & PLAN NOTE
Patient has been doing Depo-Provera but is interested in possibly doing something different.  She says it is working well but sometimes she feels odd in the first few days after the dose.  She has a history of migraines with aura so is a poor candidate for oral contraceptives due to increased risk of stroke.

## 2020-07-15 NOTE — PROGRESS NOTES
Subjective:     Chief Complaint   Patient presents with   • Rash     rash on neck since April,       Nathaly Luo is a 24 y.o. female here today for evaluation and management of:    Family planning counseling  Patient has been doing Depo-Provera but is interested in possibly doing something different.  She says it is working well but sometimes she feels odd in the first few days after the dose.  She has a history of migraines with aura so is a poor candidate for oral contraceptives due to increased risk of stroke.    Rash  Patient reports the rash on her neck has not improved and the steroids seem to make it hurt more.  It seems to flare after she is worked out and is sweaty.  She denies any new contacts.  It gets very dry and crusting.       Allergies   Allergen Reactions   • Bolanos Swelling     Swollen lip when she eats any kind of bolanos   • Other Environmental Rash     Some perfumes and hand        Current medicines (including changes today)  Current Outpatient Medications   Medication Sig Dispense Refill   • ketoconazole (NIZORAL) 2 % Cream Apply to rash BID until clear 60 g 1   • sumatriptan (IMITREX) 100 MG tablet Take 1 Tab by mouth Once PRN for Migraine for up to 1 dose. 10 Tab 3   • ibuprofen (MOTRIN) 400 MG Tab Take 1 Tab by mouth every 6 hours as needed. (Patient not taking: Reported on 6/14/2020) 30 Tab 0   • omeprazole (PRILOSEC) 20 MG delayed-release capsule Take 1 Cap by mouth every day. 30 Cap 0   • acetaminophen (TYLENOL) 500 MG Tab Take 1,000 mg by mouth every 6 hours as needed.       No current facility-administered medications for this visit.        She  has a past medical history of Migraine. She also has no past medical history of Diabetes (HCC) or Hypertension.    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  No nausea or vomiting.  No chest pain or palpitations.  No cough or SOB.  No pain with urination or hematuria.  No black or bloody stools.       Objective:     /70 (BP Location: Right arm, Patient Position: Sitting, BP Cuff Size: Adult)   Pulse 98   Temp 37.2 °C (99 °F) (Temporal)   Resp 16   Ht 1.524 m (5')   Wt 85.3 kg (188 lb)   SpO2 97%  Body mass index is 36.72 kg/m².   Physical Exam:  Well developed, well nourished.  Alert, oriented in no acute distress.  Eye contact is good, speech goal directed, affect calm  Eyes: conjunctiva non-injected, sclera non-icteric.  Neck Supple.  No adenopathy or masses in the neck or supraclavicular regions. No thyromegaly  Lungs: clear to auscultation bilaterally with good excursion. No wheezes or rhonchi  CV: regular rate and rhythm. No murmur  Skin: Erythematous scaling rash on the posterior neck in the skin folds        Assessment and Plan:   The following treatment plan was discussed    1. Rash  Ketoconazole 2% cream to affected area twice a day  - ketoconazole (NIZORAL) 2 % Cream; Apply to rash BID until clear  Dispense: 60 g; Refill: 1  - REFERRAL TO DERMATOLOGY    2. Family planning counseling  Refer to gynecology for further evaluation and treatment.  Information on IUDs and Nexplanon given.  Continue Depo-Provera until another plan is in place.  - REFERRAL TO GYNECOLOGY    Any change or worsening of signs or symptoms, patient encouraged to follow-up or report to the emergency room for further evaluation. Patient understands and agrees.    Followup: Return if symptoms worsen or fail to improve.

## 2020-08-17 ENCOUNTER — HOSPITAL ENCOUNTER (OUTPATIENT)
Dept: LAB | Facility: MEDICAL CENTER | Age: 25
End: 2020-08-17
Attending: FAMILY MEDICINE
Payer: COMMERCIAL

## 2020-08-17 ENCOUNTER — OFFICE VISIT (OUTPATIENT)
Dept: MEDICAL GROUP | Facility: LAB | Age: 25
End: 2020-08-17
Payer: COMMERCIAL

## 2020-08-17 VITALS
WEIGHT: 188 LBS | SYSTOLIC BLOOD PRESSURE: 112 MMHG | TEMPERATURE: 99 F | HEART RATE: 80 BPM | DIASTOLIC BLOOD PRESSURE: 80 MMHG | HEIGHT: 60 IN | OXYGEN SATURATION: 97 % | RESPIRATION RATE: 16 BRPM | BODY MASS INDEX: 36.91 KG/M2

## 2020-08-17 DIAGNOSIS — Z13.29 SCREENING FOR THYROID DISORDER: ICD-10-CM

## 2020-08-17 DIAGNOSIS — Z82.69 FAMILY HISTORY OF SYSTEMIC LUPUS ERYTHEMATOSUS (SLE) IN MOTHER: ICD-10-CM

## 2020-08-17 DIAGNOSIS — Z30.09 FAMILY PLANNING COUNSELING: ICD-10-CM

## 2020-08-17 DIAGNOSIS — G43.109 MIGRAINE WITH AURA AND WITHOUT STATUS MIGRAINOSUS, NOT INTRACTABLE: ICD-10-CM

## 2020-08-17 DIAGNOSIS — Z13.21 ENCOUNTER FOR VITAMIN DEFICIENCY SCREENING: ICD-10-CM

## 2020-08-17 LAB
25(OH)D3 SERPL-MCNC: 28 NG/ML (ref 30–100)
TSH SERPL DL<=0.005 MIU/L-ACNC: 2.59 UIU/ML (ref 0.38–5.33)

## 2020-08-17 PROCEDURE — 86039 ANTINUCLEAR ANTIBODIES (ANA): CPT

## 2020-08-17 PROCEDURE — 99214 OFFICE O/P EST MOD 30 MIN: CPT | Performed by: FAMILY MEDICINE

## 2020-08-17 PROCEDURE — 86235 NUCLEAR ANTIGEN ANTIBODY: CPT

## 2020-08-17 PROCEDURE — 86225 DNA ANTIBODY NATIVE: CPT

## 2020-08-17 PROCEDURE — 36415 COLL VENOUS BLD VENIPUNCTURE: CPT

## 2020-08-17 PROCEDURE — 86038 ANTINUCLEAR ANTIBODIES: CPT

## 2020-08-17 PROCEDURE — 84443 ASSAY THYROID STIM HORMONE: CPT

## 2020-08-17 PROCEDURE — 82306 VITAMIN D 25 HYDROXY: CPT

## 2020-08-17 PROCEDURE — 85652 RBC SED RATE AUTOMATED: CPT

## 2020-08-17 ASSESSMENT — FIBROSIS 4 INDEX: FIB4 SCORE: 0.34

## 2020-08-17 NOTE — PATIENT INSTRUCTIONS
Hormonal Contraception Information  Hormonal contraception is a type of birth control that uses hormones to prevent pregnancy. It usually involves a combination of the hormones estrogen and progesterone or only the hormone progesterone. Hormonal contraception works in these ways:  · It thickens the mucus in the cervix, making it harder for sperm to enter the uterus.  · It changes the lining of the uterus, making it harder for an egg to implant.  · It may stop the ovaries from releasing eggs (ovulation). Some women who take hormonal contraceptives that contain only progesterone may continue to ovulate.  Hormonal contraception cannot prevent sexually transmitted infections (STIs). Pregnancy may still occur.  Estrogen and progesterone contraceptives  Contraceptives that use a combination of estrogen and progesterone are available in these forms:  · Pill. Pills come in different combinations of hormones. They must be taken at the same time each day. Pills can affect your period, causing you to get your period once every three months or not at all.  · Patch. The patch must be worn on the lower abdomen for three weeks and then removed on the fourth.  · Vaginal ring. The ring is placed in the vagina and left there for three weeks. It is then removed for one week.  Progesterone contraceptives  Contraceptives that use progesterone only are available in these forms:  · Pill. Pills should be taken every day of the cycle.  · Intrauterine device (IUD). This device is inserted into the uterus and removed or replaced every five years or sooner.  · Implant. Plastic rods are placed under the skin of the upper arm. They are removed or replaced every three years or sooner.  · Injection. The injection is given once every 90 days.  What are the side effects?  The side effects of estrogen and progesterone contraceptives include:  · Nausea.  · Headaches.  · Breast tenderness.  · Bleeding or spotting between menstrual cycles.  · High blood  pressure (rare).  · Strokes, heart attacks, or blood clots (rare)  Side effects of progesterone-only contraceptives include:  · Nausea.  · Headaches.  · Breast tenderness.  · Unpredictable menstrual bleeding.  · High blood pressure (rare).  Talk to your health care provider about what side effects may affect you.  Where to find more information  · Ask your health care provider for more information and resources about hormonal contraception.  · [place] Department of Health and Human Services Office on Women's Health: www.womenshealth.gov  Questions to ask:  · What type of hormonal contraception is right for me?  · How long should I plan to use hormonal contraception?  · What are the side effects of the hormonal contraception method I choose?  · How can I prevent STIs while using hormonal contraception?  Contact a health care provider if:  · You start taking hormonal contraceptives and you develop persistent or severe side effects.  Summary  · Estrogen and progesterone are hormones used in many forms of birth control.  · Talk to your health care provider about what side effects may affect you.  · Hormonal contraception cannot prevent sexually transmitted infections (STIs).  · Ask your health care provider for more information and resources about hormonal contraception.  This information is not intended to replace advice given to you by your health care provider. Make sure you discuss any questions you have with your health care provider.  Document Released: 01/06/2009 Document Revised: 04/13/2020 Document Reviewed: 11/17/2017  [employer] Patient Education © 2020 [employer]

## 2020-08-18 LAB — ERYTHROCYTE [SEDIMENTATION RATE] IN BLOOD BY WESTERGREN METHOD: 5 MM/HOUR (ref 0–20)

## 2020-08-18 RX ORDER — ACETAMINOPHEN AND CODEINE PHOSPHATE 120; 12 MG/5ML; MG/5ML
1 SOLUTION ORAL DAILY
Qty: 28 TAB | Refills: 1 | Status: SHIPPED | OUTPATIENT
Start: 2020-08-18 | End: 2020-10-12 | Stop reason: SDUPTHER

## 2020-08-18 NOTE — PROGRESS NOTES
Subjective:     Chief Complaint   Patient presents with   • Contraception     discuss birth control options       Nathaly Luo is a 24 y.o. female here today for evaluation and management of:    Family planning counseling  Patient scheduled to see gynecology next month to discuss longer-term family planning.  Patient has a history of migraines with aura so is not a candidate for combination oral contraceptives.  She has been on Depo-Provera but is having a lot of spotting with this.  She is due for repeat shot later this week but would like to consider progesterone only pills until she can see gynecology.  She is using condoms when she is sexually active.  We have previously discussed the Nexplanon or an IUD.  Patient is reluctant to have an implantable device placed.       Allergies   Allergen Reactions   • Bolanos Swelling     Swollen lip when she eats any kind of bolanos   • Other Environmental Rash     Some perfumes and hand        Current medicines (including changes today)  Current Outpatient Medications   Medication Sig Dispense Refill   • norethindrone (MICRONOR) 0.35 MG tablet Take 1 Tab by mouth every day. 28 Tab 1   • ketoconazole (NIZORAL) 2 % Cream Apply to rash BID until clear 60 g 1   • sumatriptan (IMITREX) 100 MG tablet Take 1 Tab by mouth Once PRN for Migraine for up to 1 dose. 10 Tab 3   • omeprazole (PRILOSEC) 20 MG delayed-release capsule Take 1 Cap by mouth every day. 30 Cap 0   • acetaminophen (TYLENOL) 500 MG Tab Take 1,000 mg by mouth every 6 hours as needed.       No current facility-administered medications for this visit.        She  has a past medical history of Migraine. She also has no past medical history of Diabetes (HCC) or Hypertension.    Patient Active Problem List    Diagnosis Date Noted   • Family planning counseling 07/13/2020   • Migraine with aura and without status migrainosus, not intractable 05/28/2020   • Rash 05/28/2020   • Family history of systemic  lupus erythematosus (SLE) in mother 05/28/2020   • Other hemorrhoids 09/20/2019   • Other constipation 09/20/2019   • Obesity (BMI 30-39.9) 05/01/2019   • H/O Layne's palsy 04/25/2019   • Thyromegaly 04/25/2019       ROS   No fever or chills.  No nausea or vomiting.  No chest pain or palpitations.  No cough or SOB.  No pain with urination or hematuria.  No black or bloody stools.       Objective:     /80 (BP Location: Right arm, Patient Position: Sitting, BP Cuff Size: Adult)   Pulse 80   Temp 37.2 °C (99 °F) (Temporal)   Resp 16   Ht 1.524 m (5')   Wt 85.3 kg (188 lb)   SpO2 97%  Body mass index is 36.72 kg/m².   Physical Exam:  Well developed, well nourished.  Alert, oriented in no acute distress.  Eye contact is good, speech goal directed, affect calm  Eyes: conjunctiva non-injected, sclera non-icteric.  Lungs: clear to auscultation bilaterally with good excursion. No wheezes or rhonchi  CV: regular rate and rhythm. No murmur      Assessment and Plan:   The following treatment plan was discussed    1. Family planning counseling  Discussed increased risk of stroke given her history of migraines with aura with combination pills.  We will give her progesterone only pills until she sees gynecology.  Continue condom use.  Discussed the importance of taking the pills at the same time every day.  - norethindrone (MICRONOR) 0.35 MG tablet; Take 1 Tab by mouth every day.  Dispense: 28 Tab; Refill: 1    2. Migraine with aura and without status migrainosus, not intractable  Discussed increased risk of stroke given her history of migraines with aura with combination pills.  We will give her progesterone only pills until she sees gynecology.  Continue condom use.  Discussed the importance of taking the pills at the same time every day.    Any change or worsening of signs or symptoms, patient encouraged to follow-up or report to the emergency room for further evaluation. Patient understands and agrees.    Followup:  Return in about 1 year (around 8/17/2021).

## 2020-08-18 NOTE — ASSESSMENT & PLAN NOTE
Patient scheduled to see gynecology next month to discuss longer-term family planning.  Patient has a history of migraines with aura so is not a candidate for combination oral contraceptives.  She has been on Depo-Provera but is having a lot of spotting with this.  She is due for repeat shot later this week but would like to consider progesterone only pills until she can see gynecology.  She is using condoms when she is sexually active.  We have previously discussed the Nexplanon or an IUD.  Patient is reluctant to have an implantable device placed.

## 2020-08-19 LAB — NUCLEAR IGG SER QL IA: DETECTED

## 2020-08-20 LAB
ANA INTERPRETIVE COMMENT Q5143: ABNORMAL
ANA PATTERN 2 Q5146: ABNORMAL
ANA PATTERN Q5144: ABNORMAL
ANA TITER 2 Q5147: ABNORMAL
ANA TITER Q5145: ABNORMAL
ANTINUCLEAR ANTIBODY (ANA), HEP-2, IGG Q5142: DETECTED

## 2020-08-21 LAB
DSDNA AB TITR SER CLIF: NORMAL {TITER}
ENA JO1 AB TITR SER: 1 AU/ML (ref 0–40)
ENA SCL70 IGG SER QL: 2 AU/ML (ref 0–40)
ENA SM IGG SER-ACNC: 1 AU/ML (ref 0–40)
ENA SS-B IGG SER IA-ACNC: 0 AU/ML (ref 0–40)
SSA52 R0ENA AB IGG Q0420: 6 AU/ML (ref 0–40)
SSA60 R0ENA AB IGG Q0419: 1 AU/ML (ref 0–40)
U1 SNRNP IGG SER QL: 4 AU/ML (ref 0–40)

## 2020-08-26 ENCOUNTER — PATIENT MESSAGE (OUTPATIENT)
Dept: MEDICAL GROUP | Facility: LAB | Age: 25
End: 2020-08-26

## 2020-08-27 ENCOUNTER — TELEMEDICINE (OUTPATIENT)
Dept: MEDICAL GROUP | Facility: LAB | Age: 25
End: 2020-08-27
Payer: COMMERCIAL

## 2020-08-27 VITALS — BODY MASS INDEX: 36.91 KG/M2 | WEIGHT: 188 LBS | HEIGHT: 60 IN

## 2020-08-27 DIAGNOSIS — B99.9 RECURRENT INFECTIONS: ICD-10-CM

## 2020-08-27 DIAGNOSIS — E01.0 THYROMEGALY: ICD-10-CM

## 2020-08-27 DIAGNOSIS — R76.8 POSITIVE ANA (ANTINUCLEAR ANTIBODY): ICD-10-CM

## 2020-08-27 DIAGNOSIS — M79.10 MYALGIA: ICD-10-CM

## 2020-08-27 DIAGNOSIS — R53.83 OTHER FATIGUE: ICD-10-CM

## 2020-08-27 DIAGNOSIS — Z82.69 FAMILY HISTORY OF SYSTEMIC LUPUS ERYTHEMATOSUS (SLE) IN MOTHER: ICD-10-CM

## 2020-08-27 PROCEDURE — 99214 OFFICE O/P EST MOD 30 MIN: CPT | Mod: 95,CR | Performed by: FAMILY MEDICINE

## 2020-08-27 ASSESSMENT — FIBROSIS 4 INDEX: FIB4 SCORE: 0.34

## 2020-08-28 ENCOUNTER — HOSPITAL ENCOUNTER (OUTPATIENT)
Dept: LAB | Facility: MEDICAL CENTER | Age: 25
End: 2020-08-28
Attending: FAMILY MEDICINE
Payer: COMMERCIAL

## 2020-08-28 LAB
C3 SERPL-MCNC: 129.7 MG/DL (ref 87–200)
C4 SERPL-MCNC: 27.8 MG/DL (ref 19–52)
HETEROPH AB SER QL: NEGATIVE
THYROPEROXIDASE AB SERPL-ACNC: <9 IU/ML (ref 0–9)

## 2020-08-28 PROCEDURE — 83516 IMMUNOASSAY NONANTIBODY: CPT

## 2020-08-28 PROCEDURE — 86160 COMPLEMENT ANTIGEN: CPT

## 2020-08-28 PROCEDURE — 86308 HETEROPHILE ANTIBODY SCREEN: CPT

## 2020-08-28 PROCEDURE — 86162 COMPLEMENT TOTAL (CH50): CPT

## 2020-08-28 PROCEDURE — 86376 MICROSOMAL ANTIBODY EACH: CPT

## 2020-08-28 PROCEDURE — 86800 THYROGLOBULIN ANTIBODY: CPT

## 2020-08-28 PROCEDURE — 82784 ASSAY IGA/IGD/IGG/IGM EACH: CPT

## 2020-08-28 PROCEDURE — 86235 NUCLEAR ANTIGEN ANTIBODY: CPT | Mod: 91

## 2020-08-28 PROCEDURE — 86618 LYME DISEASE ANTIBODY: CPT

## 2020-08-28 PROCEDURE — 36415 COLL VENOUS BLD VENIPUNCTURE: CPT

## 2020-08-31 LAB
B BURGDOR AB SER IA-ACNC: 0.26 LIV (ref 0–1.2)
CH50 SERPL-ACNC: 71.4 U/ML (ref 38.7–89.9)
IGA SERPL-MCNC: 140 MG/DL (ref 68–408)
IGG SERPL-MCNC: 1260 MG/DL (ref 768–1632)
IGM SERPL-MCNC: 51 MG/DL (ref 35–263)
THYROGLOB AB SERPL-ACNC: <0.9 IU/ML (ref 0–4)

## 2020-08-31 NOTE — ASSESSMENT & PLAN NOTE
Patient's had a strongly positive CARLOS.  Her mom has a history of lupus and her maternal grandmother also has autoimmune disease.  Patient is complaining of myalgias and joint pain.  She has extreme fatigue.  She feels like she is always in pain and always tired.  She reports that she does not snore and has never been told that she stops breathing when she sleeps.  Results for JOSE LUIS VERAS (MRN [id number]) as of 8/31/2020 08:50   Ref. Range 8/17/2020 14:57   25-Hydroxy   Vitamin D 25 Latest Ref Range: 30 - 100 ng/mL 28 (L)   TSH Latest Ref Range: 0.380 - 5.330 uIU/mL 2.590   Anti-Dna -Ds Latest Ref Range: None Detected  None Detected   Anti-Scl-70 Latest Ref Range: 0 - 40 AU/mL 2   Anuja-1 Antibody, IgG Latest Ref Range: 0 - 40 AU/mL 1   CARLOS Interpretive Comment Unknown See Note   CARLOS Pattern Unknown Nuclear Dot (A)   CARLOS Pattern 2 Unknown Homogeneous (A)   CARLOS Titer 2 Unknown 1:640 (A)   Antinuclear Antibody Latest Ref Range: None Detected  Detected (A)   Smith Antibodies Latest Ref Range: 0 - 40 AU/mL 1   SSA 52 (R0)(NEISHA) Ab, IgG Latest Ref Range: 0 - 40 AU/mL 6   SSA 60 (R0)(NEISHA) Ab, IgG Latest Ref Range: 0 - 40 AU/mL 1   Sjogren'S Anti-Ss-B Latest Ref Range: 0 - 40 AU/mL 0   CARLOS Titer Unknown 1:1280 (A)   Antinuclear Antibody (CARLOS), HEp-2, IgG Latest Ref Range: <1:80  Detected (H)   Coker/RNP IgG (NEISHA) Latest Ref Range: 0 - 40 AU/mL 4

## 2020-08-31 NOTE — PROGRESS NOTES
Virtual Visit: Established Patient   This visit was conducted via Zoom  using secure and encrypted videoconferencing technology. The patient was in a private location in the state of [place].    The patient's identity was confirmed and verbal consent was obtained for this virtual visit.    Subjective:   CC:   Chief Complaint   Patient presents with   • Lab Results       Nathaly Luo is a 24 y.o. female presenting for evaluation and management of:    Positive CARLOS (antinuclear antibody)  Patient's had a strongly positive CARLOS.  Her mom has a history of lupus and her maternal grandmother also has autoimmune disease.  Patient is complaining of myalgias and joint pain.  She has extreme fatigue.  She feels like she is always in pain and always tired.  She reports that she does not snore and has never been told that she stops breathing when she sleeps.  Results for NATHALY LUO (MRN [id number]) as of 8/31/2020 08:50   Ref. Range 8/17/2020 14:57   25-Hydroxy   Vitamin D 25 Latest Ref Range: 30 - 100 ng/mL 28 (L)   TSH Latest Ref Range: 0.380 - 5.330 uIU/mL 2.590   Anti-Dna -Ds Latest Ref Range: None Detected  None Detected   Anti-Scl-70 Latest Ref Range: 0 - 40 AU/mL 2   Anuja-1 Antibody, IgG Latest Ref Range: 0 - 40 AU/mL 1   CARLOS Interpretive Comment Unknown See Note   CARLOS Pattern Unknown Nuclear Dot (A)   CARLOS Pattern 2 Unknown Homogeneous (A)   CARLOS Titer 2 Unknown 1:640 (A)   Antinuclear Antibody Latest Ref Range: None Detected  Detected (A)   Smith Antibodies Latest Ref Range: 0 - 40 AU/mL 1   SSA 52 (R0)(NEISHA) Ab, IgG Latest Ref Range: 0 - 40 AU/mL 6   SSA 60 (R0)(NEISHA) Ab, IgG Latest Ref Range: 0 - 40 AU/mL 1   Sjogren'S Anti-Ss-B Latest Ref Range: 0 - 40 AU/mL 0   CARLOS Titer Unknown 1:1280 (A)   Antinuclear Antibody (CARLOS), HEp-2, IgG Latest Ref Range: <1:80  Detected (H)   Coker/RNP IgG (NEISHA) Latest Ref Range: 0 - 40 AU/mL 4     Patient is very frustrated because she feels as if something is wrong that we  have not found yet.  She reports that she has difficulty functioning on a regular basis.     ROS   Denies any recent fevers or chills. No nausea or vomiting. No chest pains or shortness of breath.     Allergies   Allergen Reactions   • Bolanos Swelling     Swollen lip when she eats any kind of bolanos   • Other Environmental Rash     Some perfumes and hand        Current medicines (including changes today)  Current Outpatient Medications   Medication Sig Dispense Refill   • norethindrone (MICRONOR) 0.35 MG tablet Take 1 Tab by mouth every day. 28 Tab 1   • ketoconazole (NIZORAL) 2 % Cream Apply to rash BID until clear 60 g 1   • sumatriptan (IMITREX) 100 MG tablet Take 1 Tab by mouth Once PRN for Migraine for up to 1 dose. 10 Tab 3   • omeprazole (PRILOSEC) 20 MG delayed-release capsule Take 1 Cap by mouth every day. 30 Cap 0   • acetaminophen (TYLENOL) 500 MG Tab Take 1,000 mg by mouth every 6 hours as needed.       No current facility-administered medications for this visit.        Patient Active Problem List    Diagnosis Date Noted   • Positive CARLOS (antinuclear antibody) 08/27/2020   • Myalgia 08/27/2020   • Other fatigue 08/27/2020   • Recurrent infections 08/27/2020   • Family planning counseling 07/13/2020   • Migraine with aura and without status migrainosus, not intractable 05/28/2020   • Rash 05/28/2020   • Family history of systemic lupus erythematosus (SLE) in mother 05/28/2020   • Other hemorrhoids 09/20/2019   • Other constipation 09/20/2019   • Obesity (BMI 30-39.9) 05/01/2019   • H/O Layne's palsy 04/25/2019   • Thyromegaly 04/25/2019       Family History   Problem Relation Age of Onset   • Diabetes Mother    • Autoimmune Disease Mother    • Hypertension Mother    • Cancer Maternal Aunt         ovarian       She  has a past medical history of Migraine. She also has no past medical history of Diabetes (HCC) or Hypertension.  She  has no past surgical history on file.       Objective:   Ht 1.524 m  (5')   Wt 85.3 kg (188 lb)   BMI 36.72 kg/m²     Physical Exam:  Constitutional: Alert, tearful , well-groomed.  Skin: No rashes in visible areas.  Eye: Round. Conjunctiva clear, lids normal. No icterus.   ENMT: Lips pink without lesions, good dentition, moist mucous membranes. Phonation normal.  Neck: No masses, no thyromegaly. Moves freely without pain.  Respiratory: Unlabored respiratory effort, no cough or audible wheeze  Psych: Alert and oriented x3, normal affect and mood.       Assessment and Plan:   The following treatment plan was discussed:     1. Positive CARLOS (antinuclear antibody)  - COMPLEMENT C3; Future  - COMPLEMENT C4; Future  - COMPLEMENT TOTAL (CH50); Future  - THYROID PEROXIDASE  (TPO) AB; Future  - ANTITHYROGLOBULIN AB; Future  - MPO/KY-3 (ANCA) ABS; Future  - ANTI-NEISHA PANEL; Future    2. Myalgia  - LYME AB/WESTERN BLOT REFLEX  - MONONUCLEOSIS TEST QUAL; Future    3. Other fatigue  - LYME AB/WESTERN BLOT REFLEX  - MONONUCLEOSIS TEST QUAL; Future    4. Family history of systemic lupus erythematosus (SLE) in mother    5. Recurrent infections  - IMMUNOGLOBULINS A/G/M SERUM; Future    6. Thyromegaly  - US-SOFT TISSUES OF HEAD - NECK; Future    Discussed at length that often autoimmune disease is difficult to diagnose.  She does have a positive CARLOS but we did discuss that a small percentage of the population can have a positive CARLOS without any disease.  Given that she is having symptoms we will check some more autoimmune markers but also refer to rheumatology for further evaluation and treatment.  Thyroid labs have been normal but she does have thyromegaly.  Ultrasound of the thyroid to assess.  Await results.  We did discuss at length things to make her feel better and understanding of her frustration with the diagnoses process.    Follow-up: Return in about 4 weeks (around 9/24/2020).

## 2020-09-01 LAB
MYELOPEROXIDASE AB SER-ACNC: 0 AU/ML (ref 0–19)
PROTEINASE3 AB SER-ACNC: 0 AU/ML (ref 0–19)
SSA52 R0ENA AB IGG Q0420: 6 AU/ML (ref 0–40)
SSA60 R0ENA AB IGG Q0419: 0 AU/ML (ref 0–40)

## 2020-09-13 ENCOUNTER — PATIENT MESSAGE (OUTPATIENT)
Dept: MEDICAL GROUP | Facility: LAB | Age: 25
End: 2020-09-13

## 2020-10-12 DIAGNOSIS — Z30.09 FAMILY PLANNING COUNSELING: ICD-10-CM

## 2020-10-12 RX ORDER — ACETAMINOPHEN AND CODEINE PHOSPHATE 120; 12 MG/5ML; MG/5ML
1 SOLUTION ORAL DAILY
Qty: 28 TAB | Refills: 11 | Status: SHIPPED | OUTPATIENT
Start: 2020-10-12 | End: 2021-09-17

## 2020-10-12 NOTE — TELEPHONE ENCOUNTER
Received request via: Patient    Was the patient seen in the last year in this department? Yes  8/27/2020  Does the patient have an active prescription (recently filled or refills available) for medication(s) requested? No

## 2020-11-06 ENCOUNTER — TELEPHONE (OUTPATIENT)
Dept: MEDICAL GROUP | Facility: LAB | Age: 25
End: 2020-11-06

## 2020-11-06 NOTE — TELEPHONE ENCOUNTER
----- Message from Nathaly Luo sent at 11/6/2020  3:09 PM PST -----  Regarding: Medication Renewal Request  Contact: [phone]  Refills have been requested for the following medications:        norethindrone (MICRONOR) 0.35 MG tablet [Halie Frances, A.P.N.]    Preferred pharmacy: [facility] [address] [place] [address], [place] - [address]  Delivery method: Pickup

## 2020-11-10 ENCOUNTER — OFFICE VISIT (OUTPATIENT)
Dept: MEDICAL GROUP | Facility: LAB | Age: 25
End: 2020-11-10

## 2020-11-10 VITALS
HEIGHT: 60 IN | WEIGHT: 206 LBS | OXYGEN SATURATION: 97 % | RESPIRATION RATE: 16 BRPM | DIASTOLIC BLOOD PRESSURE: 70 MMHG | HEART RATE: 104 BPM | TEMPERATURE: 99.1 F | SYSTOLIC BLOOD PRESSURE: 120 MMHG | BODY MASS INDEX: 40.44 KG/M2

## 2020-11-10 DIAGNOSIS — R30.0 DYSURIA: ICD-10-CM

## 2020-11-10 DIAGNOSIS — K59.09 OTHER CONSTIPATION: ICD-10-CM

## 2020-11-10 DIAGNOSIS — K62.5 BRIGHT RED RECTAL BLEEDING: ICD-10-CM

## 2020-11-10 DIAGNOSIS — N91.2 AMENORRHEA: ICD-10-CM

## 2020-11-10 LAB
APPEARANCE UR: CLEAR
BILIRUB UR STRIP-MCNC: NORMAL MG/DL
COLOR UR AUTO: YELLOW
GLUCOSE UR STRIP.AUTO-MCNC: NORMAL MG/DL
INT CON NEG: NEGATIVE
INT CON POS: POSITIVE
KETONES UR STRIP.AUTO-MCNC: NORMAL MG/DL
LEUKOCYTE ESTERASE UR QL STRIP.AUTO: NORMAL
NITRITE UR QL STRIP.AUTO: NORMAL
PH UR STRIP.AUTO: 5.5 [PH] (ref 5–8)
POC URINE PREGNANCY TEST: NORMAL
PROT UR QL STRIP: NORMAL MG/DL
RBC UR QL AUTO: NORMAL
SP GR UR STRIP.AUTO: 1.02
UROBILINOGEN UR STRIP-MCNC: 0.2 MG/DL

## 2020-11-10 PROCEDURE — 81002 URINALYSIS NONAUTO W/O SCOPE: CPT | Performed by: FAMILY MEDICINE

## 2020-11-10 PROCEDURE — 81025 URINE PREGNANCY TEST: CPT | Performed by: FAMILY MEDICINE

## 2020-11-10 PROCEDURE — 99214 OFFICE O/P EST MOD 30 MIN: CPT | Performed by: FAMILY MEDICINE

## 2020-11-10 ASSESSMENT — FIBROSIS 4 INDEX: FIB4 SCORE: .3535533905932737622

## 2020-11-10 NOTE — ASSESSMENT & PLAN NOTE
She reports that her stools alternate between constipation and then diarrhea.  She has had several episode of bright red bleeding that has been rather significant.  She also has some suprapubic tenderness.  She denies any nausea or vomiting.  She has had hemorrhoids in the past but this feels different.

## 2020-11-10 NOTE — PROGRESS NOTES
Subjective:     Chief Complaint   Patient presents with   • Bloody Stools     bright red x 1 month   • GI Problem     bloated       Nathaly Luo is a 25 y.o. female here today for evaluation and management of:    Bright red rectal bleeding  She reports that her stools alternate between constipation and then diarrhea.  She has had several episode of bright red bleeding that has been rather significant.  She also has some suprapubic tenderness.  She denies any nausea or vomiting.  She has had hemorrhoids in the past but this feels different.       Allergies   Allergen Reactions   • Bolanos Swelling     Swollen lip when she eats any kind of bolanos   • Other Environmental Rash     Some perfumes and hand        Current medicines (including changes today)  Current Outpatient Medications   Medication Sig Dispense Refill   • norethindrone (MICRONOR) 0.35 MG tablet Take 1 Tab by mouth every day. 28 Tab 11   • ketoconazole (NIZORAL) 2 % Cream Apply to rash BID until clear 60 g 1   • sumatriptan (IMITREX) 100 MG tablet Take 1 Tab by mouth Once PRN for Migraine for up to 1 dose. 10 Tab 3   • omeprazole (PRILOSEC) 20 MG delayed-release capsule Take 1 Cap by mouth every day. 30 Cap 0   • acetaminophen (TYLENOL) 500 MG Tab Take 1,000 mg by mouth every 6 hours as needed.       No current facility-administered medications for this visit.        She  has a past medical history of Migraine. She also has no past medical history of Diabetes (HCC) or Hypertension.    Patient Active Problem List    Diagnosis Date Noted   • Bright red rectal bleeding 11/10/2020   • Positive CARLOS (antinuclear antibody) 08/27/2020   • Myalgia 08/27/2020   • Other fatigue 08/27/2020   • Recurrent infections 08/27/2020   • Family planning counseling 07/13/2020   • Migraine with aura and without status migrainosus, not intractable 05/28/2020   • Rash 05/28/2020   • Family history of systemic lupus erythematosus (SLE) in mother 05/28/2020   •  Other hemorrhoids 09/20/2019   • Other constipation 09/20/2019   • Obesity (BMI 30-39.9) 05/01/2019   • H/O Layne's palsy 04/25/2019   • Thyromegaly 04/25/2019       ROS   No fever or chills.  No nausea or vomiting.  No chest pain or palpitations.  No cough or SOB.  No pain with urination or hematuria.  No black or bloody stools.       Objective:     /70 (BP Location: Right arm, Patient Position: Sitting, BP Cuff Size: Adult)   Pulse (!) 104   Temp 37.3 °C (99.1 °F) (Temporal)   Resp 16   Ht 1.524 m (5')   Wt 93.4 kg (206 lb)   SpO2 97%  Body mass index is 40.23 kg/m².   Physical Exam:  Well developed, well nourished.  Alert, oriented in no acute distress.  Eye contact is good, speech goal directed, affect calm  Eyes: conjunctiva non-injected, sclera non-icteric.  Neck Supple.  No adenopathy or masses in the neck or supraclavicular regions. No thyromegaly  Lungs: clear to auscultation bilaterally with good excursion. No wheezes or rhonchi  CV: regular rate and rhythm. No murmur  Abdomen: soft, mild suprapubic tenderness, no masses or organomegaly.  No rebound or guarding  Rectal: Significant pain with digital exam.  No gross blood or melena.  No masses palpated.  No hemorrhoid noted          Assessment and Plan:   The following treatment plan was discussed    1. Other constipation  Refer to gastroenterology for further evaluation and treatment. Recommend high fiber diet  - REFERRAL TO GASTROENTEROLOGY    2. Bright red rectal bleeding  Refer to gastroenterology for further evaluation and treatment. Recommend high fiber diet  - REFERRAL TO GASTROENTEROLOGY    3. Dysuria  Normal UA  - POCT Urinalysis    4. Amenorrhea  Negative pregnancy  - POCT Pregnancy    Any change or worsening of signs or symptoms, patient encouraged to follow-up or report to the emergency room for further evaluation. Patient understands and agrees.    Followup: Return if symptoms worsen or fail to improve.

## 2021-07-24 ENCOUNTER — OFFICE VISIT (OUTPATIENT)
Dept: URGENT CARE | Facility: PHYSICIAN GROUP | Age: 26
End: 2021-07-24

## 2021-07-24 VITALS
DIASTOLIC BLOOD PRESSURE: 84 MMHG | SYSTOLIC BLOOD PRESSURE: 162 MMHG | BODY MASS INDEX: 40.05 KG/M2 | OXYGEN SATURATION: 98 % | WEIGHT: 204 LBS | HEIGHT: 60 IN | HEART RATE: 107 BPM | RESPIRATION RATE: 16 BRPM | TEMPERATURE: 98.3 F

## 2021-07-24 DIAGNOSIS — J02.9 SORE THROAT: ICD-10-CM

## 2021-07-24 DIAGNOSIS — J02.0 STREP PHARYNGITIS: ICD-10-CM

## 2021-07-24 LAB
INT CON NEG: NEGATIVE
INT CON POS: POSITIVE
S PYO AG THROAT QL: POSITIVE

## 2021-07-24 PROCEDURE — 87880 STREP A ASSAY W/OPTIC: CPT | Performed by: NURSE PRACTITIONER

## 2021-07-24 PROCEDURE — 99213 OFFICE O/P EST LOW 20 MIN: CPT | Performed by: NURSE PRACTITIONER

## 2021-07-24 RX ORDER — PENICILLIN V POTASSIUM 500 MG/1
500 TABLET ORAL 3 TIMES DAILY
Qty: 30 TABLET | Refills: 0 | Status: SHIPPED | OUTPATIENT
Start: 2021-07-24 | End: 2021-08-03

## 2021-07-24 ASSESSMENT — ENCOUNTER SYMPTOMS
TROUBLE SWALLOWING: 1
SWOLLEN GLANDS: 1
SORE THROAT: 1
FEVER: 1

## 2021-07-24 ASSESSMENT — FIBROSIS 4 INDEX: FIB4 SCORE: .3535533905932737622

## 2021-07-24 NOTE — PROGRESS NOTES
Subjective:      Nathaly Luo is a 25 y.o. female who presents with Sore Throat (pain radiates into ears; 1x wk. )            Pharyngitis   This is a new problem. Episode onset: pt reports new onset of ST and ear pain that started 6 days ago. Fevers. pain with swallowing. Pt denies any hx of strep throat. The problem has been gradually worsening. Neither side of throat is experiencing more pain than the other. Associated symptoms include swollen glands and trouble swallowing. She has tried NSAIDs (throat lozenges) for the symptoms. The treatment provided no relief.       Review of Systems   Constitutional: Positive for fever.   HENT: Positive for sore throat and trouble swallowing.    All other systems reviewed and are negative.    Past Medical History:   Diagnosis Date   • Migraine     History reviewed. No pertinent surgical history.   Social History     Socioeconomic History   • Marital status: Single     Spouse name: Not on file   • Number of children: Not on file   • Years of education: Not on file   • Highest education level: Not on file   Occupational History   • Not on file   Tobacco Use   • Smoking status: Never Smoker   • Smokeless tobacco: Never Used   Vaping Use   • Vaping Use: Never used   Substance and Sexual Activity   • Alcohol use: No   • Drug use: No   • Sexual activity: Yes     Partners: Male     Birth control/protection: Condom   Other Topics Concern   • Not on file   Social History Narrative   • Not on file     Social Determinants of Health     Financial Resource Strain:    • Difficulty of Paying Living Expenses:    Food Insecurity:    • Worried About Running Out of Food in the Last Year:    • Ran Out of Food in the Last Year:    Transportation Needs:    • Lack of Transportation (Medical):    • Lack of Transportation (Non-Medical):    Physical Activity:    • Days of Exercise per Week:    • Minutes of Exercise per Session:    Stress:    • Feeling of Stress :    Social Connections:    •  Frequency of Communication with Friends and Family:    • Frequency of Social Gatherings with Friends and Family:    • Attends [employer] Services:    • Active Member of Clubs or Organizations:    • Attends Club or Organization Meetings:    • Marital Status:    Intimate Partner Violence:    • Fear of Current or Ex-Partner:    • Emotionally Abused:    • Physically Abused:    • Sexually Abused:           Objective:     BP (!) 162/84 (BP Location: Right arm, Patient Position: Sitting, BP Cuff Size: Adult)   Pulse (!) 107   Temp 36.8 °C (98.3 °F) (Temporal)   Resp 16   Ht 1.524 m (5')   Wt 92.5 kg (204 lb)   SpO2 98%   BMI 39.84 kg/m²      Physical Exam  Vitals and nursing note reviewed.   Constitutional:       Appearance: Normal appearance. She is normal weight.   HENT:      Head: Normocephalic and atraumatic.      Nose: Nose normal.      Mouth/Throat:      Mouth: Mucous membranes are moist.      Pharynx: Oropharynx is clear. Posterior oropharyngeal erythema present.   Eyes:      Extraocular Movements: Extraocular movements intact.      Pupils: Pupils are equal, round, and reactive to light.   Cardiovascular:      Rate and Rhythm: Normal rate and regular rhythm.   Pulmonary:      Effort: Pulmonary effort is normal.   Musculoskeletal:         General: Normal range of motion.      Cervical back: Normal range of motion.   Lymphadenopathy:      Head:      Right side of head: Submandibular adenopathy present.      Left side of head: Submandibular adenopathy present.   Skin:     General: Skin is warm and dry.      Capillary Refill: Capillary refill takes less than 2 seconds.   Neurological:      General: No focal deficit present.      Mental Status: She is alert and oriented to person, place, and time. Mental status is at baseline.   Psychiatric:         Mood and Affect: Mood normal.         Speech: Speech normal.         Thought Content: Thought content normal.         Judgment: Judgment normal.                         Assessment/Plan:        1. Sore throat  - POCT Rapid Strep A POSITIVE    2. Strep pharyngitis  - penicillin v potassium (VEETID) 500 MG Tab; Take 1 tablet by mouth 3 times a day for 10 days.  Dispense: 30 tablet; Refill: 0    Take full course of abx  Warm salt water gargles  Alternate tylenol and ibuprofen for pain  Soft foods and cool liquids  Throat lozenges as directed  Work note provided  Supportive care, differential diagnoses, and indications for immediate follow-up discussed with patient.    Pathogenesis of diagnosis discussed including typical length and natural progression.      Instructed to return to  or nearest emergency department if symptoms fail to improve, for any change in condition, further concerns, or new concerning symptoms.  Patient states understanding of the plan of care and discharge instructions.

## 2021-07-24 NOTE — LETTER
July 24, 2021    To Whom It May Concern:         This is confirmation that Nathaly Luo attended her scheduled appointment with AKIKO Hodgson on 7/24/21.      Sincerely,      MILADIS Hodgson.  [phone]

## 2021-09-09 ENCOUNTER — APPOINTMENT (OUTPATIENT)
Dept: MEDICAL GROUP | Facility: MEDICAL CENTER | Age: 26
End: 2021-09-09

## 2021-09-17 DIAGNOSIS — Z30.09 FAMILY PLANNING COUNSELING: ICD-10-CM

## 2021-09-17 RX ORDER — ACETAMINOPHEN AND CODEINE PHOSPHATE 120; 12 MG/5ML; MG/5ML
SOLUTION ORAL
Qty: 28 TABLET | Refills: 0 | Status: SHIPPED | OUTPATIENT
Start: 2021-09-17

## 2021-09-17 NOTE — TELEPHONE ENCOUNTER
Received request via: Pharmacy    Was the patient seen in the last year in this department? Yes  LOV: 7/24/2021  Does the patient have an active prescription (recently filled or refills available) for medication(s) requested? No

## 2023-04-19 ENCOUNTER — PATIENT MESSAGE (OUTPATIENT)
Dept: HEALTH INFORMATION MANAGEMENT | Facility: OTHER | Age: 28
End: 2023-04-19

## 2023-04-19 ENCOUNTER — DOCUMENTATION (OUTPATIENT)
Dept: HEALTH INFORMATION MANAGEMENT | Facility: OTHER | Age: 28
End: 2023-04-19
Payer: COMMERCIAL

## 2023-07-18 ENCOUNTER — TELEPHONE (OUTPATIENT)
Dept: HEALTH INFORMATION MANAGEMENT | Facility: OTHER | Age: 28
End: 2023-07-18
Payer: COMMERCIAL